# Patient Record
Sex: MALE | Race: WHITE | NOT HISPANIC OR LATINO | Employment: FULL TIME | URBAN - METROPOLITAN AREA
[De-identification: names, ages, dates, MRNs, and addresses within clinical notes are randomized per-mention and may not be internally consistent; named-entity substitution may affect disease eponyms.]

---

## 2017-03-14 RX ORDER — PANTOPRAZOLE SODIUM 40 MG/1
40 TABLET, DELAYED RELEASE ORAL DAILY
Qty: 30 TABLET | Refills: 10 | OUTPATIENT
Start: 2017-03-14

## 2017-03-14 NOTE — TELEPHONE ENCOUNTER
----- Message from Maritza Vyas sent at 3/14/2017 12:54 PM CDT -----  Contact: dav/wife [phone]  State that pt needs refill on pantoprazole. Pt uses     [id number] - [place] - [address]  [address]  [place] [address]  Phone: [phone] Fax: [phone]    Please call back at [phone]//thank you acc

## 2017-03-14 NOTE — TELEPHONE ENCOUNTER
I have refused a medicine for the patient.  Please CALL the patient and book the patient for a physical with one of us here in the clinic.   PLEASE DOCUMENT THE FACT THAT YOU HAVE CONTACTED THE PATIENT IN THE NOTES AND NOT IN ROUTING COMMENTS FOR FUTURE REFERENCE

## 2017-03-15 RX ORDER — PANTOPRAZOLE SODIUM 40 MG/1
40 TABLET, DELAYED RELEASE ORAL DAILY
Qty: 30 TABLET | Refills: 10 | OUTPATIENT
Start: 2017-03-15

## 2017-03-16 ENCOUNTER — OFFICE VISIT (OUTPATIENT)
Dept: FAMILY MEDICINE | Facility: CLINIC | Age: 47
End: 2017-03-16
Payer: COMMERCIAL

## 2017-03-16 VITALS
WEIGHT: 168.13 LBS | HEART RATE: 73 BPM | OXYGEN SATURATION: 98 % | DIASTOLIC BLOOD PRESSURE: 83 MMHG | TEMPERATURE: 98 F | SYSTOLIC BLOOD PRESSURE: 134 MMHG | BODY MASS INDEX: 30.94 KG/M2 | HEIGHT: 62 IN

## 2017-03-16 DIAGNOSIS — Z00.00 ANNUAL PHYSICAL EXAM: Primary | ICD-10-CM

## 2017-03-16 DIAGNOSIS — K21.9 GASTROESOPHAGEAL REFLUX DISEASE WITHOUT ESOPHAGITIS: ICD-10-CM

## 2017-03-16 PROCEDURE — 99999 PR PBB SHADOW E&M-EST. PATIENT-LVL III: CPT | Mod: PBBFAC,,, | Performed by: FAMILY MEDICINE

## 2017-03-16 PROCEDURE — 99396 PREV VISIT EST AGE 40-64: CPT | Mod: S$GLB,,, | Performed by: FAMILY MEDICINE

## 2017-03-16 RX ORDER — PANTOPRAZOLE SODIUM 40 MG/1
40 TABLET, DELAYED RELEASE ORAL DAILY
Qty: 30 TABLET | Refills: 0 | Status: SHIPPED | OUTPATIENT
Start: 2017-03-16 | End: 2017-03-16 | Stop reason: SDUPTHER

## 2017-03-16 RX ORDER — PANTOPRAZOLE SODIUM 40 MG/1
40 TABLET, DELAYED RELEASE ORAL DAILY
Qty: 90 TABLET | Refills: 3 | Status: SHIPPED | OUTPATIENT
Start: 2017-03-16 | End: 2018-09-13 | Stop reason: SDUPTHER

## 2017-03-16 NOTE — MR AVS SNAPSHOT
[facility]  [address]  [place] [address]  Phone: [phone]  Fax: [phone]                  Young Haynes Jr.   3/16/2017 3:00 PM   Office Visit    Description:  Male : 1970   Provider:  Oswaldo Childress MD   Department:  [facility]           Diagnoses this Visit        Comments    Annual physical exam    -  Primary     Gastroesophageal reflux disease without esophagitis                To Do List           Future Appointments        Provider Department Dept Phone    3/17/2017 11:25 AM LABORATORY, [place] [facility] [phone]      Goals (5 Years of Data)     None       These Medications        Disp Refills Start End    pantoprazole (PROTONIX) 40 MG tablet 90 tablet 3 3/16/2017     Take 1 tablet (40 mg total) by mouth once daily. - Oral    Pharmacy: [id number] - [place] - [address] #: [phone]         [facility]     [facility] Nurse Care Line -  Assistance  Registered nurses in the [facility] provide clinical advisement, health education, appointment booking, and other advisory services.  Call for this free service at [phone].             Medications           Message regarding Medications     Verify the changes and/or additions to your medication regime listed below are the same as discussed with your clinician today.  If any of these changes or additions are incorrect, please notify your healthcare provider.        STOP taking these medications     sucralfate (CARAFATE) 1 gram tablet Take 1 tablet (1 g total) by mouth 4 (four) times daily before meals and nightly.           Verify that the below list of medications is an accurate representation of the medications you are currently taking.  If none reported, the list may be blank. If incorrect, please contact your healthcare provider. Carry this list with you in case of emergency.          "  Current Medications     pantoprazole (PROTONIX) 40 MG tablet Take 1 tablet (40 mg total) by mouth once daily.           Clinical Reference Information           Your Vitals Were     BP Pulse Temp Height Weight SpO2    134/83 73 98.3 °F (36.8 °C) (Oral) 5' 2" (1.575 m) 76.2 kg (168 lb 1.6 oz) 98%    BMI                30.75 kg/m2          Blood Pressure          Most Recent Value    BP  134/83      Allergies as of 3/16/2017     No Known Allergies      Immunizations Administered on Date of Encounter - 3/16/2017     None      Orders Placed During Today's Visit     Future Labs/Procedures Expected by Expires    Comprehensive metabolic panel  3/17/2017 (Approximate) 3/31/2017    Lipid panel  3/17/2017 (Approximate) 3/31/2017      [facility] Sign-Up     Activating your [facility] account is as easy as 1-2-3!     1) Visit [email or web address], select Sign Up Now, enter this activation code and your date of birth, then select Next.  Activation code not generated  Current Patient Portal Status: Account disabled      2) Create a username and password to use when you visit [facility] in the future and select a security question in case you lose your password and select Next.    3) Enter your e-mail address and click Sign Up!    Additional Information  If you have questions, please e-mail [email or web address] or call [phone] to talk to our [facility] staff. Remember, [facility] is NOT to be used for urgent needs. For medical emergencies, dial 911.         Language Assistance Services     ATTENTION: Language assistance services are available, free of charge. Please call [phone].      ATENCIÓN: Si habla español, tiene a valenzuela disposición servicios gratuitos de asistencia lingüística. Llame al [phone].     CHÚ Ý: N?u b?n nói Ti?ng Vi?t, có các d?ch v? h? tr? ngôn ng? mi?n phí dành cho b?n. G?i s? [phone].         [facility] complies with applicable Federal civil rights laws and does not discriminate on " the basis of race, color, national origin, age, disability, or sex.

## 2017-03-16 NOTE — PROGRESS NOTES
Subjective:      Patient ID: Young Haynes Jr. is a 46 y.o. male.    Chief Complaint: annual  HPIhe has GERD. Crawfish and redbeans flare him.   The patient presents with GERD.  Denies chest pain, nausea & vomiting, belching, cramping, distention, dyspepsia, dysphagia, hematochezia, melena, abdominal pain and weight loss.  Current treatment has included medications that are listed in medications list with significant response.  There has been no medicine intolerance.  The patient cannot identify any exacerbating factors.  Avoidance of NSAID's, ASA, carbonated beverages and spicy food was discussed.  We discussed limiting the dose of the med and to see if he could change to qod dosing of this.  He will try this.     Health Maintenance Due   Topic Date Due    Lipid Panel  1970    Influenza Vaccine  08/01/2016       Past Medical History:  Past Medical History:   Diagnosis Date    GERD (gastroesophageal reflux disease)      No past surgical history on file.  Review of patient's allergies indicates:  No Known Allergies  Current Outpatient Prescriptions on File Prior to Visit   Medication Sig Dispense Refill    pantoprazole (PROTONIX) 40 MG tablet Take 1 tablet (40 mg total) by mouth once daily. 30 tablet 0                   No current facility-administered medications on file prior to visit.      Social History     Social History    Marital status:      Spouse name: N/A    Number of children: N/A    Years of education: N/A     Occupational History     [employer]     Social History Main Topics    Smoking status: Never Smoker    Smokeless tobacco: Never Used    Alcohol use No    Drug use: No    Sexual activity: Not on file     Other Topics Concern    Not on file     Social History Narrative     Family History   Problem Relation Age of Onset    Hypertension Father     Cancer Maternal Grandfather     Heart attack Paternal Grandfather     Stroke Paternal Grandfather     Diabetes Paternal  "Grandfather              Review of Systems   Constitutional: Negative.  Negative for chills, diaphoresis and fever.   HENT: Negative for congestion, hearing loss, mouth sores, postnasal drip and sore throat.    Eyes: Negative for pain and visual disturbance.   Respiratory: Negative for cough, chest tightness, shortness of breath and wheezing.    Cardiovascular: Negative for chest pain.   Gastrointestinal: Negative for abdominal pain, anal bleeding, blood in stool, constipation, diarrhea, nausea and vomiting.   Genitourinary: Negative for dysuria and hematuria.   Musculoskeletal: Negative for back pain, neck pain and neck stiffness.   Skin: Negative for rash.   Neurological: Negative for dizziness and weakness.       Objective:   /83  Pulse 73  Temp 98.3 °F (36.8 °C) (Oral)   Ht 5' 2" (1.575 m)  Wt 76.2 kg (168 lb 1.6 oz)  SpO2 98%  BMI 30.75 kg/m2    Physical Exam   Constitutional: He is oriented to person, place, and time. He appears well-developed and well-nourished.   HENT:   Head: Normocephalic and atraumatic.   Right Ear: External ear normal.   Left Ear: External ear normal.   Nose: Nose normal.   Mouth/Throat: Oropharynx is clear and moist. No oropharyngeal exudate.   Eyes: Conjunctivae and EOM are normal. Pupils are equal, round, and reactive to light. Right eye exhibits no discharge. Left eye exhibits no discharge. No scleral icterus.   Neck: Normal range of motion. Neck supple. No JVD present. No thyromegaly present.   Cardiovascular: Normal rate, regular rhythm, normal heart sounds and intact distal pulses.  Exam reveals no gallop and no friction rub.    No murmur heard.  Pulmonary/Chest: Effort normal and breath sounds normal. No respiratory distress. He has no wheezes. He has no rales. He exhibits no tenderness.   Abdominal: Soft. Bowel sounds are normal. He exhibits no distension and no mass. There is no tenderness. There is no rebound and no guarding.   Musculoskeletal: Normal range of " motion. He exhibits no edema or tenderness.   Lymphadenopathy:     He has no cervical adenopathy.   Neurological: He is alert and oriented to person, place, and time. No cranial nerve deficit. Coordination normal.   Skin: Skin is warm and dry. He is not diaphoretic.   Psychiatric: He has a normal mood and affect.       Assessment:     1. Annual physical exam    2. Gastroesophageal reflux disease without esophagitis        Plan:   Diagnoses and all orders for this visit:    Annual physical exam  -     Comprehensive metabolic panel; Future  -     Lipid panel; Future    Gastroesophageal reflux disease without esophagitis  -     pantoprazole (PROTONIX) 40 MG tablet; Take 1 tablet (40 mg total) by mouth once daily.        rtc annually.

## 2017-03-17 ENCOUNTER — LAB VISIT (OUTPATIENT)
Dept: LAB | Facility: HOSPITAL | Age: 47
End: 2017-03-17
Attending: FAMILY MEDICINE
Payer: COMMERCIAL

## 2017-03-17 DIAGNOSIS — Z00.00 ANNUAL PHYSICAL EXAM: ICD-10-CM

## 2017-03-17 LAB
ALBUMIN SERPL BCP-MCNC: 4 G/DL
ALP SERPL-CCNC: 66 U/L
ALT SERPL W/O P-5'-P-CCNC: 39 U/L
ANION GAP SERPL CALC-SCNC: 7 MMOL/L
AST SERPL-CCNC: 22 U/L
BILIRUB SERPL-MCNC: 0.5 MG/DL
BUN SERPL-MCNC: 15 MG/DL
CALCIUM SERPL-MCNC: 9.3 MG/DL
CHLORIDE SERPL-SCNC: 104 MMOL/L
CHOLEST/HDLC SERPL: 2.6 {RATIO}
CO2 SERPL-SCNC: 28 MMOL/L
CREAT SERPL-MCNC: 0.9 MG/DL
EST. GFR  (AFRICAN AMERICAN): >60 ML/MIN/1.73 M^2
EST. GFR  (NON AFRICAN AMERICAN): >60 ML/MIN/1.73 M^2
GLUCOSE SERPL-MCNC: 87 MG/DL
HDL/CHOLESTEROL RATIO: 37.7 %
HDLC SERPL-MCNC: 151 MG/DL
HDLC SERPL-MCNC: 57 MG/DL
LDLC SERPL CALC-MCNC: 86.4 MG/DL
NONHDLC SERPL-MCNC: 94 MG/DL
POTASSIUM SERPL-SCNC: 4.5 MMOL/L
PROT SERPL-MCNC: 6.6 G/DL
SODIUM SERPL-SCNC: 139 MMOL/L
TRIGL SERPL-MCNC: 38 MG/DL

## 2017-03-17 PROCEDURE — 80061 LIPID PANEL: CPT

## 2017-03-17 PROCEDURE — 80053 COMPREHEN METABOLIC PANEL: CPT

## 2017-03-17 PROCEDURE — 36415 COLL VENOUS BLD VENIPUNCTURE: CPT | Mod: PO

## 2017-04-05 ENCOUNTER — OFFICE VISIT (OUTPATIENT)
Dept: FAMILY MEDICINE | Facility: CLINIC | Age: 47
End: 2017-04-05
Payer: COMMERCIAL

## 2017-04-05 ENCOUNTER — LAB VISIT (OUTPATIENT)
Dept: LAB | Facility: HOSPITAL | Age: 47
End: 2017-04-05
Attending: FAMILY MEDICINE
Payer: COMMERCIAL

## 2017-04-05 VITALS
OXYGEN SATURATION: 96 % | WEIGHT: 167.88 LBS | SYSTOLIC BLOOD PRESSURE: 129 MMHG | HEIGHT: 62 IN | DIASTOLIC BLOOD PRESSURE: 80 MMHG | BODY MASS INDEX: 30.89 KG/M2 | HEART RATE: 79 BPM | TEMPERATURE: 98 F

## 2017-04-05 DIAGNOSIS — R31.29 MICROSCOPIC HEMATURIA: ICD-10-CM

## 2017-04-05 DIAGNOSIS — R10.31 RIGHT GROIN PAIN: ICD-10-CM

## 2017-04-05 DIAGNOSIS — R10.2 SUPRAPUBIC DISCOMFORT: Primary | ICD-10-CM

## 2017-04-05 DIAGNOSIS — R10.2 SUPRAPUBIC DISCOMFORT: ICD-10-CM

## 2017-04-05 LAB
BILIRUB UR QL STRIP: NEGATIVE
CLARITY UR: CLEAR
COLOR UR: YELLOW
GLUCOSE UR QL STRIP: NEGATIVE
HGB UR QL STRIP: ABNORMAL
KETONES UR QL STRIP: NEGATIVE
LEUKOCYTE ESTERASE UR QL STRIP: NEGATIVE
NITRITE UR QL STRIP: NEGATIVE
PH UR STRIP: 7 [PH] (ref 5–8)
PROT UR QL STRIP: NEGATIVE
SP GR UR STRIP: 1.02 (ref 1–1.03)
URN SPEC COLLECT METH UR: ABNORMAL
WBC # BLD AUTO: 6.67 K/UL

## 2017-04-05 PROCEDURE — 99999 PR PBB SHADOW E&M-EST. PATIENT-LVL III: CPT | Mod: PBBFAC,,, | Performed by: NURSE PRACTITIONER

## 2017-04-05 PROCEDURE — 36415 COLL VENOUS BLD VENIPUNCTURE: CPT | Mod: PO

## 2017-04-05 PROCEDURE — 81002 URINALYSIS NONAUTO W/O SCOPE: CPT | Mod: PO

## 2017-04-05 PROCEDURE — 99213 OFFICE O/P EST LOW 20 MIN: CPT | Mod: S$GLB,,, | Performed by: NURSE PRACTITIONER

## 2017-04-05 PROCEDURE — 85048 AUTOMATED LEUKOCYTE COUNT: CPT | Mod: PO

## 2017-04-05 PROCEDURE — 1160F RVW MEDS BY RX/DR IN RCRD: CPT | Mod: S$GLB,,, | Performed by: NURSE PRACTITIONER

## 2017-04-05 NOTE — MR AVS SNAPSHOT
"    [facility]  [address] [address]  Phone: [phone]  Fax: [phone]                  Young Haynes JrWarren   2017 3:00 PM   Office Visit    Description:  Male : 1970   Provider:  Gina Quispe NP   Department:  [facility]           Reason for Visit     Abdominal Pain           Diagnoses this Visit        Comments    Suprapubic discomfort    -  Primary     Right groin pain         Microscopic hematuria                To Do List           Goals (5 Years of Data)     None      [facility]     [facility] On [facility] Nurse Care Line -  Assistance  Unless otherwise directed by your provider, please contact [facility], our nurse care line that is available for  assistance.     Registered nurses in the [facility] provide: appointment scheduling, clinical advisement, health education, and other advisory services.  Call: [phone] (toll free)               Medications           Message regarding Medications     Verify the changes and/or additions to your medication regime listed below are the same as discussed with your clinician today.  If any of these changes or additions are incorrect, please notify your healthcare provider.             Verify that the below list of medications is an accurate representation of the medications you are currently taking.  If none reported, the list may be blank. If incorrect, please contact your healthcare provider. Carry this list with you in case of emergency.           Current Medications     pantoprazole (PROTONIX) 40 MG tablet Take 1 tablet (40 mg total) by mouth once daily.           Clinical Reference Information           Your Vitals Were     BP Pulse Temp Height Weight SpO2    129/80 79 98.2 °F (36.8 °C) 5' 2" (1.575 m) 76.1 kg (167 lb 14.1 oz) 96%    BMI                30.71 kg/m2          Blood Pressure          Most Recent Value    BP  129/80      Allergies as of " 4/5/2017     No Known Allergies      Immunizations Administered on Date of Encounter - 4/5/2017     None      Orders Placed During Today's Visit      Normal Orders This Visit    Urinalysis     Future Labs/Procedures Expected by Expires    CT Renal Stone Study ABD Pelvis WO  4/5/2017 4/5/2018    WBC  4/5/2017 6/4/2018      Instructions    Report to ER immediately if symptoms worsen       Language Assistance Services     ATTENTION: Language assistance services are available, free of charge. Please call [phone].      ATENCIÓN: Si neftalila alex, tiene a valenzuela disposición servicios gratuitos de asistencia lingüística. Llame al [phone].     CHÚ Ý: N?u b?n nói Ti?ng Vi?t, có các d?ch v? h? tr? ngôn ng? mi?n phí dành cho b?n. G?i s? [phone].         [facility] complies with applicable Federal civil rights laws and does not discriminate on the basis of race, color, national origin, age, disability, or sex.

## 2017-04-05 NOTE — PROGRESS NOTES
Subjective:       Patient ID: Young Haynes Jr. is a 46 y.o. male.    Chief Complaint: Abdominal Pain    Abdominal Pain   This is a new problem. The current episode started 1 to 4 weeks ago. The onset quality is gradual. The problem occurs intermittently. The problem has been unchanged. The pain is located in the suprapubic region (right groin). The quality of the pain is dull and aching. The abdominal pain does not radiate. Associated symptoms include nausea. Pertinent negatives include no anorexia, arthralgias, belching, constipation, diarrhea, dysuria, fever, flatus, frequency, headaches, hematochezia, hematuria, melena, myalgias, vomiting or weight loss. The pain is aggravated by urination. The pain is relieved by nothing. He has tried nothing for the symptoms. The treatment provided no relief. Prior diagnostic workup includes CT scan (Ordered today). His past medical history is significant for GERD. There is no history of abdominal surgery, colon cancer, Crohn's disease, gallstones, irritable bowel syndrome, pancreatitis, PUD or ulcerative colitis.     Past Medical History:   Diagnosis Date    GERD (gastroesophageal reflux disease)      Social History     Social History    Marital status:      Spouse name: N/A    Number of children: N/A    Years of education: N/A     Occupational History     [employer]     Social History Main Topics    Smoking status: Never Smoker    Smokeless tobacco: Never Used    Alcohol use No    Drug use: No    Sexual activity: Not on file     Social History Narrative     History reviewed. No pertinent surgical history.    Review of Systems   Constitutional: Negative.  Negative for fever and weight loss.   HENT: Negative.    Eyes: Negative.    Respiratory: Negative.    Cardiovascular: Negative.    Gastrointestinal: Positive for abdominal pain and nausea. Negative for anorexia, constipation, diarrhea, flatus, hematochezia, melena and vomiting.   Endocrine: Negative.     Genitourinary: Negative.  Negative for dysuria, frequency and hematuria.   Musculoskeletal: Negative.  Negative for arthralgias and myalgias.   Skin: Negative.    Allergic/Immunologic: Negative.    Neurological: Negative.  Negative for headaches.   Psychiatric/Behavioral: Negative.        Objective:      Physical Exam   Constitutional: He is oriented to person, place, and time. He appears well-developed and well-nourished.   HENT:   Head: Normocephalic.   Right Ear: External ear normal.   Left Ear: External ear normal.   Nose: Nose normal.   Mouth/Throat: Oropharynx is clear and moist.   Eyes: Conjunctivae are normal. Pupils are equal, round, and reactive to light.   Neck: Normal range of motion. Neck supple.   Cardiovascular: Normal rate, regular rhythm and normal heart sounds.    Pulmonary/Chest: Effort normal and breath sounds normal.   Abdominal: Soft. Bowel sounds are normal. There is tenderness in the suprapubic area. There is no rigidity, no rebound, no guarding, no CVA tenderness, no tenderness at McBurney's point and negative Briseno's sign.   Musculoskeletal: Normal range of motion.   Neurological: He is alert and oriented to person, place, and time.   Skin: Skin is warm and dry.   Psychiatric: He has a normal mood and affect. His behavior is normal. Judgment and thought content normal.   Nursing note and vitals reviewed.      Assessment:       1. Suprapubic discomfort    2. Right groin pain    3.      Microscopic hematuria   Plan:           Young was seen today for abdominal pain.    Diagnoses and all orders for this visit:    Suprapubic discomfort  Right groin pain  Microscopic hematuria  -     Urinalysis  -     WBC; Future  -     CT Renal Stone Study ABD Pelvis WO; Future  Report to ER immediately if symptoms worsen

## 2017-04-06 ENCOUNTER — TELEPHONE (OUTPATIENT)
Dept: FAMILY MEDICINE | Facility: CLINIC | Age: 47
End: 2017-04-06

## 2017-04-06 NOTE — TELEPHONE ENCOUNTER
----- Message from Joan Bishop sent at 4/6/2017 11:54 AM CDT -----  Contact: Wife- [phone]  Would like an order for MRI sent to [facility].  Please call back @ [phone].  Thanks-[facility]

## 2017-04-10 ENCOUNTER — HOSPITAL ENCOUNTER (OUTPATIENT)
Dept: RADIOLOGY | Facility: HOSPITAL | Age: 47
Discharge: HOME | End: 2017-04-10
Attending: NURSE PRACTITIONER
Payer: COMMERCIAL

## 2017-04-10 DIAGNOSIS — R10.31 RIGHT GROIN PAIN: ICD-10-CM

## 2017-04-10 DIAGNOSIS — R31.29 MICROSCOPIC HEMATURIA: ICD-10-CM

## 2017-04-10 DIAGNOSIS — R10.2 SUPRAPUBIC DISCOMFORT: ICD-10-CM

## 2017-04-10 PROCEDURE — 74176 CT ABD & PELVIS W/O CONTRAST: CPT | Mod: 26,,, | Performed by: RADIOLOGY

## 2017-04-10 PROCEDURE — 74176 CT ABD & PELVIS W/O CONTRAST: CPT | Mod: TC,PO

## 2017-04-11 ENCOUNTER — PATIENT MESSAGE (OUTPATIENT)
Dept: FAMILY MEDICINE | Facility: CLINIC | Age: 47
End: 2017-04-11

## 2017-04-12 NOTE — TELEPHONE ENCOUNTER
I reviewed the chart and all testing and called the pateint.  At this time, he is having pain when he urinates and he has had pressure and pain go to hsi back and scrotum.  I suggested that he come to me today to get his prostate checked but he can't now and requested to come tomorrow and would like to see a man.  Please book with Edward After 3 PM for a prostate check and for abdominal pain and ask Edward to consider a urology referral due to his urinary issues and horseshoe kidney if nothing is found on the prostate like prostatitis.

## 2017-04-12 NOTE — TELEPHONE ENCOUNTER
Patient will be coming to see you tomorrow. Dr. Childress suggest you consider a urology referral after checking his prostate

## 2017-04-12 NOTE — TELEPHONE ENCOUNTER
Pt wants to know what kind of Pain medicine does the Doctor recommend or what else he could do for the pain.

## 2017-04-13 ENCOUNTER — OFFICE VISIT (OUTPATIENT)
Dept: FAMILY MEDICINE | Facility: CLINIC | Age: 47
End: 2017-04-13
Payer: COMMERCIAL

## 2017-04-13 VITALS
BODY MASS INDEX: 30.86 KG/M2 | HEART RATE: 72 BPM | WEIGHT: 167.69 LBS | HEIGHT: 62 IN | DIASTOLIC BLOOD PRESSURE: 83 MMHG | SYSTOLIC BLOOD PRESSURE: 122 MMHG

## 2017-04-13 DIAGNOSIS — R10.2 SUPRAPUBIC DISCOMFORT: Primary | ICD-10-CM

## 2017-04-13 PROCEDURE — 99999 PR PBB SHADOW E&M-EST. PATIENT-LVL III: CPT | Mod: PBBFAC,,,

## 2017-04-13 PROCEDURE — 99213 OFFICE O/P EST LOW 20 MIN: CPT | Mod: S$GLB,,,

## 2017-04-13 PROCEDURE — 1160F RVW MEDS BY RX/DR IN RCRD: CPT | Mod: S$GLB,,,

## 2017-04-13 NOTE — PROGRESS NOTES
Subjective:       Patient ID: Young Haynes Jr. is a 46 y.o. male.    Chief Complaint: No chief complaint on file.    HPI   Patient presents today with a 6 day complaint of suprapubic discomfort that he describes as a constant moderate to severe intensity pressure he says when he urinates he feels a pulling sensation in his right lower quadrant.  Says that the pain radiates to his testicles while urinating.  He denies any pain with bowel movements.  She denies any feeling of fullness in his perineum with setting.  He denies any difficulty starting or stopping urine flow.  He denies any dribbling.    Review of Systems   Constitutional: Negative for activity change, appetite change, fatigue and unexpected weight change.   HENT: Negative.    Eyes: Negative.    Respiratory: Negative for cough, chest tightness, shortness of breath and wheezing.    Cardiovascular: Negative for chest pain, palpitations and leg swelling.   Gastrointestinal: Positive for abdominal pain. Negative for blood in stool, constipation, diarrhea, nausea and vomiting.   Endocrine: Negative.    Genitourinary: Positive for testicular pain. Negative for discharge, penile pain, penile swelling and scrotal swelling.   Musculoskeletal: Negative.    Skin: Negative for color change.   Allergic/Immunologic: Negative.    Neurological: Negative for dizziness, weakness and light-headedness.   Hematological: Negative.    Psychiatric/Behavioral: Negative for sleep disturbance.         Objective:      Physical Exam   Constitutional: He is oriented to person, place, and time. Vital signs are normal. He appears well-developed and well-nourished. He is active and cooperative. No distress.   HENT:   Head: Normocephalic and atraumatic.   Eyes: Conjunctivae are normal. Pupils are equal, round, and reactive to light. No scleral icterus.   Neck: Normal range of motion. Neck supple.   Cardiovascular: Normal rate, regular rhythm, normal heart sounds and intact distal  pulses.  Exam reveals no gallop and no friction rub.    No murmur heard.  Pulmonary/Chest: Effort normal and breath sounds normal. No respiratory distress. He has no wheezes. He has no rales. He exhibits no tenderness.   Abdominal: Normal appearance and bowel sounds are normal. He exhibits no shifting dullness, no distension, no pulsatile liver, no fluid wave, no abdominal bruit, no ascites, no pulsatile midline mass and no mass. There is no hepatosplenomegaly. There is tenderness in the right lower quadrant, suprapubic area and left lower quadrant. There is no rigidity, no rebound, no guarding, no CVA tenderness, no tenderness at McBurney's point and negative Briseno's sign. No hernia. Hernia confirmed negative in the right inguinal area and confirmed negative in the left inguinal area.   Genitourinary: Rectum normal, prostate normal, testes normal and penis normal. Cremasteric reflex is present. Right testis shows no mass, no swelling and no tenderness. Right testis is descended. Cremasteric reflex is not absent on the right side. Left testis shows no mass, no swelling and no tenderness. Left testis is descended. Cremasteric reflex is not absent on the left side. Uncircumcised.   Musculoskeletal: Normal range of motion. He exhibits no edema or tenderness.   Lymphadenopathy: No inguinal adenopathy noted on the right or left side.   Neurological: He is alert and oriented to person, place, and time. He exhibits normal muscle tone. Coordination normal.   Skin: Skin is warm and dry. No rash noted. He is not diaphoretic. No erythema. No pallor.   Psychiatric: He has a normal mood and affect. His speech is normal and behavior is normal. Judgment and thought content normal.       Assessment:       1. Suprapubic discomfort          Plan:   Suprapubic discomfort  -     Ambulatory referral to Urology            Disclaimer: This note is prepared using voice recognition software.  As such there may be errors in the dictation.   It has not been proofread.

## 2017-04-13 NOTE — MR AVS SNAPSHOT
[facility]  [address] [facility][1] [address]  Phone: [phone]  Fax: [phone]                  Young Haynes JrWarren   2017 5:40 PM   Office Visit    Description:  Male : 1970   Provider:  NICHOLAS Rivera   Department:  [facility]           Diagnoses this Visit        Comments    Suprapubic discomfort    -  Primary            To Do List           Future Appointments        Provider Department Dept Phone    2017 5:40 PM NICHOLAS Rivera [facility] [phone]    2017 1:40 PM Oswaldo Childress MD [facility] [phone]    5/10/2017 10:45 AM SHELTON Bowen MD [facility] [phone]      Goals (5 Years of Data)     None      [facility]     [facility] Care Line -  Assistance  Unless otherwise directed by your provider, please contact [facility], our nurse care line that is available for  assistance.     Registered nurses in the [facility] provide: appointment scheduling, clinical advisement, health education, and other advisory services.  Call: [phone] (toll free)               Medications           Message regarding Medications     Verify the changes and/or additions to your medication regime listed below are the same as discussed with your clinician today.  If any of these changes or additions are incorrect, please notify your healthcare provider.             Verify that the below list of medications is an accurate representation of the medications you are currently taking.  If none reported, the list may be blank. If incorrect, please contact your healthcare provider. Carry this list with you in case of emergency.           Current Medications     pantoprazole (PROTONIX) 40 MG tablet Take 1 tablet (40 mg total) by mouth once daily.           Clinical Reference Information           Your Vitals Were     BP Pulse Height Weight BMI     "122/83 72 5' 2" (1.575 m) 76.1 kg (167 lb 10.6 oz) 30.67 kg/m2      Blood Pressure          Most Recent Value    BP  122/83      Allergies as of 4/13/2017     No Known Allergies      Immunizations Administered on Date of Encounter - 4/13/2017     None      Orders Placed During Today's Visit      Normal Orders This Visit    Ambulatory referral to Urology       Language Assistance Services     ATTENTION: Language assistance services are available, free of charge. Please call [phone].      ATENCIÓN: Si thomas burrellvon, tiene a valenzuela disposición servicios gratuitos de asistencia lingüística. Llame al [phone].     DAMON Ý: N?u b?n nói Ti?ng Vi?t, có các d?ch v? h? tr? ngôn ng? mi?n phí dành cho b?n. G?i s? [phone].         [facility] complies with applicable Federal civil rights laws and does not discriminate on the basis of race, color, national origin, age, disability, or sex.        "

## 2017-05-08 ENCOUNTER — OFFICE VISIT (OUTPATIENT)
Dept: FAMILY MEDICINE | Facility: CLINIC | Age: 47
End: 2017-05-08
Payer: COMMERCIAL

## 2017-05-08 VITALS
DIASTOLIC BLOOD PRESSURE: 82 MMHG | BODY MASS INDEX: 31.46 KG/M2 | SYSTOLIC BLOOD PRESSURE: 120 MMHG | WEIGHT: 170.94 LBS | HEIGHT: 62 IN | HEART RATE: 71 BPM

## 2017-05-08 DIAGNOSIS — K80.20 CALCULUS OF GALLBLADDER WITHOUT CHOLECYSTITIS WITHOUT OBSTRUCTION: ICD-10-CM

## 2017-05-08 DIAGNOSIS — Q63.1 HORSESHOE KIDNEY: ICD-10-CM

## 2017-05-08 PROCEDURE — 99213 OFFICE O/P EST LOW 20 MIN: CPT | Mod: S$GLB,,, | Performed by: FAMILY MEDICINE

## 2017-05-08 PROCEDURE — 1160F RVW MEDS BY RX/DR IN RCRD: CPT | Mod: S$GLB,,, | Performed by: FAMILY MEDICINE

## 2017-05-08 PROCEDURE — 99999 PR PBB SHADOW E&M-EST. PATIENT-LVL III: CPT | Mod: PBBFAC,,, | Performed by: FAMILY MEDICINE

## 2017-05-08 NOTE — PROGRESS NOTES
Subjective:      Patient ID: Young Haynes Jr. is a 46 y.o. male.    Chief Complaint: Follow-up    HPI   He is f/u on his suprapubic pain which has resolved since he had his last visit.     Narrative      CT of the abdomen and pelvis without contrast    History: Right lower abdominal pain    Comparison: None    Technique:CT of the Abdomen and Pelvis was acquired helically without IV contrast from the lung bases through the ischial tuberosities. Axial and reformatted images were reviewed.     Findings:    ABDOMEN    Lung bases:Unremarkable    Liver/gallbladder/biliary:The liver demonstrates a negative noncontrast appearance. There are a few calcified gallstones near the neck of the gallbladder.  No inflammatory stranding seen adjacent to the gallbladder.No biliary ductal dilation.    Pancreas:The pancreas demonstrates a negative noncontrast appearance.    Spleen:The spleen is not enlarged.    Adrenals:Unremarkable    Kidneys:A horseshoe kidney is noted.  There is a nonobstructing calculus within the lower pole of the left kidney that measures 3 mm.    Bowel/Mesentery:There is no evidence of bowel obstruction. No mesenteric stranding or adenopathy.    Retroperitoneum:No adenopathy.The aorta demonstrates a normal caliber.    PELVIS:    Genitourinary/Reproductive organs:Calcification noted within the central portion of the prostate gland.    Adenopathy:None    Free Fluid:No free fluid    Osseus Structures/Soft tissues:No suspicious appearing osseus lesions. No significant soft tissue abnormality.       Impression          1. Horseshoe kidney demonstrating a single nonobstructing lower pole left renal calculus.    2.  Cholelithiasis without evidence to suggest cholecystitis.    3.  Normal appearing appendix demonstrating an appendicolith near the tip.  No inflammatory stranding adjacent to the appendix.          Electronically signed by: NEELAM SMITH D.O.  Date: 04/10/17  Time: 15:12         Encounter      View  "Encounter           He states that he does not have pain in the epigastric or RUQ areas.   He states that he has been doing well with out any pain noted now.  He has been on a PPI in the past and is still on this.      Review of Systems    Objective:   /82  Pulse 71  Ht 5' 2" (1.575 m)  Wt 77.6 kg (170 lb 15.5 oz)  BMI 31.27 kg/m2    Physical Exam   Constitutional: He appears well-developed and well-nourished.   Cardiovascular: Normal rate, regular rhythm and normal heart sounds.    Pulmonary/Chest: Effort normal and breath sounds normal.   Abdominal: Soft. Bowel sounds are normal. He exhibits no distension and no mass. There is no tenderness. There is no rebound and no guarding.       Assessment:     1. Calculus of gallbladder without cholecystitis without obstruction    2. Horseshoe kidney        Plan:   Young was seen today for follow-up.    Diagnoses and all orders for this visit:    Calculus of gallbladder without cholecystitis without obstruction    Horseshoe kidney          "

## 2017-05-10 ENCOUNTER — OFFICE VISIT (OUTPATIENT)
Dept: UROLOGY | Facility: CLINIC | Age: 47
End: 2017-05-10
Payer: COMMERCIAL

## 2017-05-10 VITALS
HEIGHT: 62 IN | SYSTOLIC BLOOD PRESSURE: 128 MMHG | DIASTOLIC BLOOD PRESSURE: 84 MMHG | WEIGHT: 170.19 LBS | BODY MASS INDEX: 31.32 KG/M2 | RESPIRATION RATE: 18 BRPM | HEART RATE: 68 BPM

## 2017-05-10 DIAGNOSIS — Q63.1 HORSESHOE KIDNEY: ICD-10-CM

## 2017-05-10 DIAGNOSIS — N20.0 KIDNEY STONE: ICD-10-CM

## 2017-05-10 DIAGNOSIS — R10.9 RIGHT FLANK PAIN: Primary | ICD-10-CM

## 2017-05-10 LAB
BILIRUB SERPL-MCNC: NORMAL MG/DL
BLOOD URINE, POC: NORMAL
COLOR, POC UA: NORMAL
GLUCOSE UR QL STRIP: NORMAL
KETONES UR QL STRIP: NORMAL
LEUKOCYTE ESTERASE URINE, POC: NORMAL
NITRITE, POC UA: NORMAL
PH, POC UA: 7
PROTEIN, POC: NORMAL
SPECIFIC GRAVITY, POC UA: 1.01
UROBILINOGEN, POC UA: NORMAL

## 2017-05-10 PROCEDURE — 99999 PR PBB SHADOW E&M-EST. PATIENT-LVL III: CPT | Mod: PBBFAC,,, | Performed by: UROLOGY

## 2017-05-10 PROCEDURE — 81002 URINALYSIS NONAUTO W/O SCOPE: CPT | Mod: S$GLB,,, | Performed by: UROLOGY

## 2017-05-10 PROCEDURE — 1160F RVW MEDS BY RX/DR IN RCRD: CPT | Mod: S$GLB,,, | Performed by: UROLOGY

## 2017-05-10 PROCEDURE — 99204 OFFICE O/P NEW MOD 45 MIN: CPT | Mod: 25,S$GLB,, | Performed by: UROLOGY

## 2017-05-10 NOTE — PROGRESS NOTES
Subjective:       Patient ID: Young Haynes Jr. is a 46 y.o. male.    Chief Complaint: Consult (horse- shoe shaped kidney)    HPI     46 year old with right flank pain last month.  He also complains of a pressure sensation in his bladder and some problems urinating.   These symptoms have since resolved but he still has some intermittent discomfort in the right flank area.  He denies hematuria and dysuria.  He denies nocturia.  He has no history of stones.  He underwent a CT scan which showed a small non-obstructing left lower pole stone.  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    Urine dipstick shows negative for all components.    Review of Systems   Constitutional: Negative for fever.   Eyes: Negative for visual disturbance.   Respiratory: Negative for shortness of breath.    Cardiovascular: Negative for chest pain.   Gastrointestinal: Negative for nausea and vomiting.   Genitourinary: Negative for dysuria and hematuria.   Musculoskeletal: Negative for gait problem.   Skin: Negative for rash.   Neurological: Negative for seizures.   Psychiatric/Behavioral: Negative for confusion.       Objective:      Physical Exam   Constitutional: He is oriented to person, place, and time. He appears well-developed and well-nourished.   HENT:   Head: Normocephalic and atraumatic.   Eyes: Conjunctivae are normal.   Cardiovascular: Normal rate.    Pulmonary/Chest: Effort normal.   Abdominal: Soft. He exhibits no distension. There is no tenderness. There is no CVA tenderness. Hernia confirmed negative in the right inguinal area and confirmed negative in the left inguinal area.   Genitourinary: Testes normal and penis normal.   Musculoskeletal: Normal range of motion. He exhibits no edema.   Lymphadenopathy: No inguinal adenopathy noted on the right or left side.   Neurological: He is alert and oriented to person, place, and time.   Skin: Skin is warm and dry. No rash noted.   Psychiatric: He has a normal mood and  affect.   Vitals reviewed.      Assessment:       1. Right flank pain    2. Kidney stone    3. Horseshoe kidney        Plan:       Right flank pain  -     POCT URINE DIPSTICK WITHOUT MICROSCOPE    Kidney stone    Horseshoe kidney      I suspected he passed a kidney stone whic caused his symptoms.  He has a small stone remaining 2-3 mm.  I recommend observation.  No treatment necessary for horseshoe kidney

## 2017-05-10 NOTE — LETTER
May 10, 2017      Edward Pink, NICHOLAS  [address] [address] [address]           [facility]  [address] [facility] [address] [place] [address]  Phone: [phone]          Patient: Young Haynes Jr.   MR Number: [id number]   YOB: 1970   Date of Visit: 5/10/2017       Dear Edward Pink:    Thank you for referring Young Haynes to me for evaluation. Attached you will find relevant portions of my assessment and plan of care.    If you have questions, please do not hesitate to call me. I look forward to following Young Haynes along with you.    Sincerely,    SHELTON Bowen MD    Enclosure  CC:  No Recipients    If you would like to receive this communication electronically, please contact [email or web address] or [phone] to request more information on [employer] access.    For providers and/or their staff who would like to refer a patient to [facility], please contact us through our one-stop-shop provider referral line, [facility] , at [phone].    If you feel you have received this communication in error or would no longer like to receive these types of communications, please e-mail [email or web address]

## 2018-04-11 ENCOUNTER — OFFICE VISIT (OUTPATIENT)
Dept: FAMILY MEDICINE | Facility: CLINIC | Age: 48
End: 2018-04-11
Payer: COMMERCIAL

## 2018-04-11 VITALS
TEMPERATURE: 98 F | BODY MASS INDEX: 29.66 KG/M2 | WEIGHT: 161.19 LBS | HEIGHT: 62 IN | HEART RATE: 72 BPM | DIASTOLIC BLOOD PRESSURE: 77 MMHG | SYSTOLIC BLOOD PRESSURE: 122 MMHG

## 2018-04-11 DIAGNOSIS — R42 VERTIGO: Primary | ICD-10-CM

## 2018-04-11 DIAGNOSIS — R73.9 HYPERGLYCEMIA: ICD-10-CM

## 2018-04-11 PROCEDURE — 99213 OFFICE O/P EST LOW 20 MIN: CPT | Mod: S$GLB,,, | Performed by: NURSE PRACTITIONER

## 2018-04-11 PROCEDURE — 99999 PR PBB SHADOW E&M-EST. PATIENT-LVL III: CPT | Mod: PBBFAC,,, | Performed by: NURSE PRACTITIONER

## 2018-04-11 RX ORDER — DIAZEPAM 5 MG/1
5 TABLET ORAL EVERY 8 HOURS PRN
Qty: 21 TABLET | Refills: 0 | Status: SHIPPED | OUTPATIENT
Start: 2018-04-11 | End: 2019-03-13

## 2018-04-11 RX ORDER — MECLIZINE HYDROCHLORIDE 25 MG/1
25 TABLET ORAL
COMMUNITY
Start: 2018-04-05 | End: 2018-04-11 | Stop reason: ALTCHOICE

## 2018-04-11 NOTE — PROGRESS NOTES
Subjective:      Patient ID: Young Haynes Jr. is a 47 y.o. male.    Chief Complaint: Hospital Follow Up    HPI   Transitional Care Note    Family and/or Caretaker present at visit?  No.  Diagnostic tests reviewed/disposition: No diagnosic tests pending after this hospitalization.  Disease/illness education: Vertigo, chest pain-resolved, and GERD  Home health/community services discussion/referrals: Patient does not have home health established from hospital visit.  They do not need home health.  If needed, we will set up home health for the patient.   Establishment or re-establishment of referral orders for community resources: No other necessary community resources.   Discussion with other health care providers: will refer to ENT.     Patient to clinic following hospitalization at [place] last week.  He reports he went to [place] ER for extreme dizziness and then began to feel nausea with a pressure in his epigastric area.  He had a negative cardiac work-up that included EKG, Stress test, cardiac enzymes.  He also had a negative CT of the head.  He reports his chest discomfort resolved while at the hospital and he has not had any before or since this episode.  He was noted to have an elevated glucose while in hospital and his A1C was 6.1%.  He voices he was given meclizine for his dizziness and this has not improved his symptoms.  He admits to tinnitus in right ear.  He describes the dizziness at the room spinning and it is exacerbated by quick movements.  The dizziness lasts several second and then resolves.  He is not experiencing nausea with the dizziness anymore.  Dizziness is ongoing x 1 week.  He cannot identify any other exacerbating or mitigating factors.    Review of Systems   Constitutional: Negative for chills, fatigue and fever.   HENT: Positive for tinnitus (right ear).    Respiratory: Negative for cough, shortness of breath and wheezing.    Cardiovascular: Negative for chest pain,  "palpitations and leg swelling.   Gastrointestinal: Negative for nausea.   Skin: Negative for rash and wound.   Neurological: Positive for dizziness. Negative for weakness and numbness.   Psychiatric/Behavioral: The patient is not nervous/anxious.        Objective:     /77   Pulse 72   Temp 98.2 °F (36.8 °C) (Oral)   Ht 5' 2" (1.575 m)   Wt 73.1 kg (161 lb 2.5 oz)   BMI 29.48 kg/m²     Physical Exam   Constitutional: He is oriented to person, place, and time. He appears well-developed and well-nourished.   HENT:   Head: Normocephalic.   Right Ear: Tympanic membrane is erythematous (very mildly erythematous).   Left Ear: Tympanic membrane is erythematous (very mildly erythematous).   Nose: Rhinorrhea present. No mucosal edema. Right sinus exhibits no maxillary sinus tenderness and no frontal sinus tenderness. Left sinus exhibits no maxillary sinus tenderness and no frontal sinus tenderness.   Mouth/Throat: Oropharynx is clear and moist.   Eyes: Pupils are equal, round, and reactive to light.   Cardiovascular: Normal rate, regular rhythm and normal heart sounds.    Pulmonary/Chest: Effort normal and breath sounds normal. No respiratory distress. He has no decreased breath sounds. He has no wheezes. He has no rhonchi. He has no rales.   Lymphadenopathy:     He has no cervical adenopathy.   Neurological: He is alert and oriented to person, place, and time.   Negative martine-hallpike maneuver    Skin: Skin is warm and dry. No rash noted.   Psychiatric: He has a normal mood and affect. His behavior is normal. Judgment and thought content normal.   Vitals reviewed.    Assessment:     1. Vertigo    2. Hyperglycemia        Plan:     Problem List Items Addressed This Visit     None      Visit Diagnoses     Vertigo    -  Primary    Relevant Medications    diazePAM (VALIUM) 5 MG tablet    Other Relevant Orders    Ambulatory referral to ENT    Hyperglycemia          Will try valium short term since meclizine is not " working-advised patient to d/c meclizine at this time  Patient will follow up for annual exam and at that time we will re-evaluate his fasting glucose  In the meantime, I have advised patient to eat a healthy diet low in starchy, sugary, and high carbohydrate foods  Will see ENT for further evaluation of vertigo  The patient was checked in the [facility] Board of Pharmacy's  Prescription Monitoring Program. There appears to be no incongruities with the patient's verbalized history.     Follow-up in about 2 weeks (around 4/25/2018), or if symptoms worsen or fail to improve, for annual exam.

## 2018-04-11 NOTE — PATIENT INSTRUCTIONS
Do not drive or operate machinery while taking Valium    Dizziness (Uncertain Cause)  Dizziness is a common symptom. It may be described as lightheadedness, spinning, or feeling like you are going to faint. Dizziness can have many causes.  Be sure to tell the healthcare provider about:  · All medicines you take, including prescription, over-the-counter, herbs, and supplements  · Any other symptoms you have  · Any health problems you are being treated for  · Anything that causes the dizziness to get worse or better  Today's exam did not show an exact cause for your dizziness. Other tests may be needed. Follow up with your healthcare provider.  Home care  · Dizziness that occurs with sudden standing may be a sign of mild dehydration. Drink extra fluids for the next few days.  · If you recently started a new medicine, stopped a medicine, or had the dose of a current medicine changed, talk with the prescribing healthcare provider. Your medicine plan may need adjustment.  · If dizziness lasts more than a few seconds, sit or lie down until it passes. This may help prevent injury in case you pass out.  · Do not drive or use power tools or dangerous equipment until you have had no dizziness for at least 48 hours.  Follow-up care  Follow up with your healthcare provider for further evaluation within the next 7 days or as advised.  When to seek medical advice  Call your healthcare provider for any of the following:  · Worsening of symptoms or new symptoms  · Passing out or seizure  · Repeated vomiting  · Headache  · Palpitations (the sense that your heart is fluttering or beating fast or hard)  · Shortness of breath  · Blood in vomit or stool (black or red color)  · Weakness of an arm or leg or one side of the face  · Vision or hearing changes  · Trouble walking or speaking  · Chest, arm, neck, back, or jaw pain  Date Last Reviewed: 8/23/2015  © 0765-1923 [employer]. [address] [place] [address], [place] [address].  All rights reserved. This information is not intended as a substitute for professional medical care. Always follow your healthcare professional's instructions.

## 2018-09-13 DIAGNOSIS — K21.9 GASTROESOPHAGEAL REFLUX DISEASE WITHOUT ESOPHAGITIS: ICD-10-CM

## 2018-09-13 RX ORDER — PANTOPRAZOLE SODIUM 40 MG/1
TABLET, DELAYED RELEASE ORAL
Qty: 90 TABLET | Refills: 2 | Status: SHIPPED | OUTPATIENT
Start: 2018-09-13 | End: 2019-10-31 | Stop reason: SDUPTHER

## 2019-03-13 ENCOUNTER — OFFICE VISIT (OUTPATIENT)
Dept: FAMILY MEDICINE | Facility: CLINIC | Age: 49
End: 2019-03-13
Payer: COMMERCIAL

## 2019-03-13 VITALS
BODY MASS INDEX: 31.43 KG/M2 | HEIGHT: 62 IN | TEMPERATURE: 101 F | SYSTOLIC BLOOD PRESSURE: 126 MMHG | DIASTOLIC BLOOD PRESSURE: 78 MMHG | HEART RATE: 110 BPM | WEIGHT: 170.81 LBS

## 2019-03-13 DIAGNOSIS — J10.1 INFLUENZA A: Primary | ICD-10-CM

## 2019-03-13 DIAGNOSIS — R05.9 COUGH: ICD-10-CM

## 2019-03-13 DIAGNOSIS — R50.9 FEVER, UNSPECIFIED FEVER CAUSE: ICD-10-CM

## 2019-03-13 DIAGNOSIS — J02.9 PHARYNGITIS, UNSPECIFIED ETIOLOGY: ICD-10-CM

## 2019-03-13 LAB
DEPRECATED S PYO AG THROAT QL EIA: NEGATIVE
INFLUENZA A, MOLECULAR: POSITIVE
INFLUENZA B, MOLECULAR: NEGATIVE
SPECIMEN SOURCE: ABNORMAL

## 2019-03-13 PROCEDURE — 3008F PR BODY MASS INDEX (BMI) DOCUMENTED: ICD-10-PCS | Mod: CPTII,S$GLB,, | Performed by: NURSE PRACTITIONER

## 2019-03-13 PROCEDURE — 99213 OFFICE O/P EST LOW 20 MIN: CPT | Mod: S$GLB,,, | Performed by: NURSE PRACTITIONER

## 2019-03-13 PROCEDURE — 87502 INFLUENZA DNA AMP PROBE: CPT | Mod: PO

## 2019-03-13 PROCEDURE — 87081 CULTURE SCREEN ONLY: CPT

## 2019-03-13 PROCEDURE — 3008F BODY MASS INDEX DOCD: CPT | Mod: CPTII,S$GLB,, | Performed by: NURSE PRACTITIONER

## 2019-03-13 PROCEDURE — 99999 PR PBB SHADOW E&M-EST. PATIENT-LVL IV: ICD-10-PCS | Mod: PBBFAC,,, | Performed by: NURSE PRACTITIONER

## 2019-03-13 PROCEDURE — 87880 STREP A ASSAY W/OPTIC: CPT | Mod: PO

## 2019-03-13 PROCEDURE — 99213 PR OFFICE/OUTPT VISIT, EST, LEVL III, 20-29 MIN: ICD-10-PCS | Mod: S$GLB,,, | Performed by: NURSE PRACTITIONER

## 2019-03-13 PROCEDURE — 99999 PR PBB SHADOW E&M-EST. PATIENT-LVL IV: CPT | Mod: PBBFAC,,, | Performed by: NURSE PRACTITIONER

## 2019-03-13 RX ORDER — OSELTAMIVIR PHOSPHATE 75 MG/1
75 CAPSULE ORAL 2 TIMES DAILY
Qty: 10 CAPSULE | Refills: 0 | Status: SHIPPED | OUTPATIENT
Start: 2019-03-13 | End: 2019-03-18

## 2019-03-13 RX ORDER — PROMETHAZINE HYDROCHLORIDE AND DEXTROMETHORPHAN HYDROBROMIDE 6.25; 15 MG/5ML; MG/5ML
5 SYRUP ORAL 2 TIMES DAILY PRN
Qty: 118 ML | Refills: 0 | Status: SHIPPED | OUTPATIENT
Start: 2019-03-13 | End: 2019-03-23

## 2019-03-13 NOTE — PATIENT INSTRUCTIONS
Hydrate well  Alternate tylenol and motrin every 4 h  Rest  Report to ER immediately if symptoms worsen

## 2019-03-13 NOTE — PROGRESS NOTES
Subjective:       Patient ID: Young Haynes Jr. is a 48 y.o. male.    Chief Complaint: Fever; Cough; Headache; and Nasal Congestion    Influenza   This is a new problem. The current episode started yesterday. The problem occurs daily. The problem has been unchanged. Associated symptoms include congestion, coughing, a fever, myalgias and a sore throat. Pertinent negatives include no abdominal pain, anorexia, arthralgias, change in bowel habit, chest pain, chills, diaphoresis, fatigue, headaches, joint swelling, nausea, neck pain, numbness, rash, swollen glands, urinary symptoms, vertigo, visual change, vomiting or weakness. Nothing aggravates the symptoms. He has tried nothing for the symptoms. The treatment provided no relief.     Past Medical History:   Diagnosis Date    GERD (gastroesophageal reflux disease)      Social History     Socioeconomic History    Marital status:      Spouse name: Not on file    Number of children: Not on file    Years of education: Not on file    Highest education level: Not on file   Social Needs    Financial resource strain: Not on file    Food insecurity - worry: Not on file    Food insecurity - inability: Not on file    Transportation needs - medical: Not on file    Transportation needs - non-medical: Not on file   Occupational History     Employer: [employer]   Tobacco Use    Smoking status: Never Smoker    Smokeless tobacco: Never Used   Substance and Sexual Activity    Alcohol use: No    Drug use: No    Sexual activity: Not on file   Other Topics Concern    Not on file   Social History Narrative    Not on file     History reviewed. No pertinent surgical history.    Review of Systems   Constitutional: Positive for fever. Negative for chills, diaphoresis and fatigue.   HENT: Positive for congestion and sore throat.    Eyes: Negative.    Respiratory: Positive for cough.    Cardiovascular: Negative.  Negative for chest pain.   Gastrointestinal: Negative.   Negative for abdominal pain, anorexia, change in bowel habit, nausea and vomiting.   Endocrine: Negative.    Genitourinary: Negative.    Musculoskeletal: Positive for myalgias. Negative for arthralgias, joint swelling and neck pain.   Skin: Negative.  Negative for rash.   Allergic/Immunologic: Negative.    Neurological: Negative.  Negative for vertigo, weakness, numbness and headaches.   Psychiatric/Behavioral: Negative.        Objective:      Physical Exam   Constitutional: He is oriented to person, place, and time. He appears well-developed and well-nourished.   HENT:   Head: Normocephalic.   Right Ear: External ear normal.   Left Ear: External ear normal.   Nose: Nose normal.   Mouth/Throat: Oropharynx is clear and moist.   Eyes: Conjunctivae are normal. Pupils are equal, round, and reactive to light.   Neck: Normal range of motion. Neck supple.   Cardiovascular: Normal rate, regular rhythm and normal heart sounds.   Pulmonary/Chest: Effort normal and breath sounds normal.   Abdominal: Soft. Bowel sounds are normal.   Musculoskeletal: Normal range of motion.   Neurological: He is alert and oriented to person, place, and time.   Skin: Skin is warm and dry. Capillary refill takes 2 to 3 seconds.   Psychiatric: He has a normal mood and affect. His behavior is normal. Judgment and thought content normal.   Nursing note and vitals reviewed.      Assessment:       1. Influenza A    2. Fever, unspecified fever cause    3. Cough    4. Pharyngitis, unspecified etiology        Plan:           Young was seen today for fever, cough, headache and nasal congestion.    Diagnoses and all orders for this visit:    Influenza A  Fever, unspecified fever cause  Cough  Pharyngitis, unspecified etiology  -     Influenza A & B by Molecular  -     Throat Screen, Rapid  -     promethazine-dextromethorphan (PROMETHAZINE-DM) 6.25-15 mg/5 mL Syrp; Take 5 mLs by mouth 2 (two) times daily as needed.  -     Strep A culture, throat  -      oseltamivir (TAMIFLU) 75 MG capsule; Take 1 capsule (75 mg total) by mouth 2 (two) times daily. for 5 days  Hydrate well  Alternate tylenol and motrin every 4 h  Rest  Report to ER immediately if symptoms worsen

## 2019-03-16 LAB — BACTERIA THROAT CULT: NORMAL

## 2019-10-31 DIAGNOSIS — K21.9 GASTROESOPHAGEAL REFLUX DISEASE WITHOUT ESOPHAGITIS: ICD-10-CM

## 2019-11-01 RX ORDER — PANTOPRAZOLE SODIUM 40 MG/1
TABLET, DELAYED RELEASE ORAL
Qty: 90 TABLET | Refills: 1 | Status: SHIPPED | OUTPATIENT
Start: 2019-11-01 | End: 2020-04-22 | Stop reason: SDUPTHER

## 2019-11-01 NOTE — TELEPHONE ENCOUNTER
The patient is overdue to see me as a provider to maintain me as PCP in the clinic by [facility] because others have been providing care to the patient.  Please arrange for the patient to see me in the near future to update meds/labs and to maintain current patient status.  It if appears to be appropriate, a telehealth visit may be used for this.  I have refilled the requested medicine that prompted this review x 1.

## 2020-01-02 ENCOUNTER — LAB VISIT (OUTPATIENT)
Dept: LAB | Facility: HOSPITAL | Age: 50
End: 2020-01-02
Attending: PEDIATRICS
Payer: COMMERCIAL

## 2020-01-02 DIAGNOSIS — Z52.001 STEM CELL DONOR: Primary | ICD-10-CM

## 2020-01-02 DIAGNOSIS — Z52.001 STEM CELL DONOR: ICD-10-CM

## 2020-01-02 PROCEDURE — 81373 HLA I TYPING 1 LOCUS LR: CPT | Mod: 91,PO

## 2020-01-02 PROCEDURE — 81376 HLA II TYPING 1 LOCUS LR: CPT | Mod: 91,PO

## 2020-01-02 PROCEDURE — 81376 HLA II TYPING 1 LOCUS LR: CPT | Mod: PO,59

## 2020-01-02 PROCEDURE — 81373 HLA I TYPING 1 LOCUS LR: CPT | Mod: PO

## 2020-01-10 LAB — HLATY INTERPRETATION: NORMAL

## 2020-01-11 LAB
HLA DRB4 1: NORMAL
HLA SSO DNA TYPING CLASS I & II INTERPRETATION: NORMAL
HLA-A 1 SERO. EQUIV: 3
HLA-A 1: NORMAL
HLA-A 2 SERO. EQUIV: 68
HLA-A 2: NORMAL
HLA-B 1 SERO. EQUIV: 8
HLA-B 1: NORMAL
HLA-B 2 SERO. EQUIV: 52
HLA-B 2: NORMAL
HLA-BW 1 SERO. EQUIV: 6
HLA-BW 2 SERO. EQUIV: 4
HLA-C 1: NORMAL
HLA-C 2: NORMAL
HLA-CW 1 SERO. EQUIV: 7
HLA-CW 2 SERO. EQUIV: 15
HLA-DQ 1 SERO. EQUIV: 2
HLA-DQ 2 SERO. EQUIV: 8
HLA-DQB1 1: NORMAL
HLA-DQB1 2: NORMAL
HLA-DRB1 1 SERO. EQUIV: 17
HLA-DRB1 1: NORMAL
HLA-DRB1 2 SERO. EQUIV: 4
HLA-DRB1 2: NORMAL
HLA-DRB3 1: NORMAL
HLA-DRB3 2: NORMAL
HLA-DRB345 1 SERO. EQUIV: 52
HLA-DRB345 2 SERO. EQUIV: 53
HLA-DRB4 2: NORMAL
HLA-DRB5 1: NORMAL
HLA-DRB5 2: NORMAL
SSDQB TESTING DATE: NORMAL
SSDRB TESTING DATE: NORMAL
SSOA TESTING DATE: NORMAL
SSOB TESTING DATE: NORMAL
SSOC TESTING DATE: NORMAL
SSODR TESTING DATE: NORMAL
TYSSO TESTING DATE: NORMAL

## 2020-04-22 DIAGNOSIS — K21.9 GASTROESOPHAGEAL REFLUX DISEASE WITHOUT ESOPHAGITIS: ICD-10-CM

## 2020-04-22 RX ORDER — PANTOPRAZOLE SODIUM 40 MG/1
TABLET, DELAYED RELEASE ORAL
Qty: 90 TABLET | Refills: 0 | Status: SHIPPED | OUTPATIENT
Start: 2020-04-22 | End: 2020-07-18

## 2020-07-18 DIAGNOSIS — K21.9 GASTROESOPHAGEAL REFLUX DISEASE WITHOUT ESOPHAGITIS: ICD-10-CM

## 2020-07-18 RX ORDER — PANTOPRAZOLE SODIUM 40 MG/1
TABLET, DELAYED RELEASE ORAL
Qty: 90 TABLET | Refills: 1 | Status: SHIPPED | OUTPATIENT
Start: 2020-07-18 | End: 2021-05-19 | Stop reason: SDUPTHER

## 2020-12-07 ENCOUNTER — HOSPITAL ENCOUNTER (OUTPATIENT)
Dept: RADIOLOGY | Facility: HOSPITAL | Age: 50
Discharge: HOME | End: 2020-12-07
Attending: NURSE PRACTITIONER
Payer: COMMERCIAL

## 2020-12-07 ENCOUNTER — OFFICE VISIT (OUTPATIENT)
Dept: FAMILY MEDICINE | Facility: CLINIC | Age: 50
End: 2020-12-07
Payer: COMMERCIAL

## 2020-12-07 VITALS
HEART RATE: 81 BPM | SYSTOLIC BLOOD PRESSURE: 134 MMHG | BODY MASS INDEX: 32.27 KG/M2 | TEMPERATURE: 98 F | HEIGHT: 62 IN | DIASTOLIC BLOOD PRESSURE: 83 MMHG | WEIGHT: 175.38 LBS

## 2020-12-07 DIAGNOSIS — R10.32 LLQ PAIN: ICD-10-CM

## 2020-12-07 DIAGNOSIS — M54.50 LOW BACK PAIN, UNSPECIFIED BACK PAIN LATERALITY, UNSPECIFIED CHRONICITY, UNSPECIFIED WHETHER SCIATICA PRESENT: Primary | ICD-10-CM

## 2020-12-07 LAB
BILIRUB UR QL STRIP: NEGATIVE
CLARITY UR: CLEAR
COLOR UR: YELLOW
GLUCOSE UR QL STRIP: NEGATIVE
HGB UR QL STRIP: NEGATIVE
KETONES UR QL STRIP: NEGATIVE
LEUKOCYTE ESTERASE UR QL STRIP: NEGATIVE
NITRITE UR QL STRIP: NEGATIVE
PH UR STRIP: 7 [PH] (ref 5–8)
PROT UR QL STRIP: NEGATIVE
SP GR UR STRIP: 1.01 (ref 1–1.03)
URN SPEC COLLECT METH UR: NORMAL

## 2020-12-07 PROCEDURE — 99999 PR PBB SHADOW E&M-EST. PATIENT-LVL III: CPT | Mod: PBBFAC,,, | Performed by: NURSE PRACTITIONER

## 2020-12-07 PROCEDURE — 3008F PR BODY MASS INDEX (BMI) DOCUMENTED: ICD-10-PCS | Mod: CPTII,S$GLB,, | Performed by: NURSE PRACTITIONER

## 2020-12-07 PROCEDURE — 81002 URINALYSIS NONAUTO W/O SCOPE: CPT | Mod: PO

## 2020-12-07 PROCEDURE — 87086 URINE CULTURE/COLONY COUNT: CPT

## 2020-12-07 PROCEDURE — 1125F AMNT PAIN NOTED PAIN PRSNT: CPT | Mod: S$GLB,,, | Performed by: NURSE PRACTITIONER

## 2020-12-07 PROCEDURE — 74019 RADEX ABDOMEN 2 VIEWS: CPT | Mod: 26,,, | Performed by: RADIOLOGY

## 2020-12-07 PROCEDURE — 3008F BODY MASS INDEX DOCD: CPT | Mod: CPTII,S$GLB,, | Performed by: NURSE PRACTITIONER

## 2020-12-07 PROCEDURE — 74019 RADEX ABDOMEN 2 VIEWS: CPT | Mod: TC,PO

## 2020-12-07 PROCEDURE — 74019 XR ABDOMEN FLAT AND ERECT: ICD-10-PCS | Mod: 26,,, | Performed by: RADIOLOGY

## 2020-12-07 PROCEDURE — 1125F PR PAIN SEVERITY QUANTIFIED, PAIN PRESENT: ICD-10-PCS | Mod: S$GLB,,, | Performed by: NURSE PRACTITIONER

## 2020-12-07 PROCEDURE — 99999 PR PBB SHADOW E&M-EST. PATIENT-LVL III: ICD-10-PCS | Mod: PBBFAC,,, | Performed by: NURSE PRACTITIONER

## 2020-12-07 PROCEDURE — 99213 OFFICE O/P EST LOW 20 MIN: CPT | Mod: S$GLB,,, | Performed by: NURSE PRACTITIONER

## 2020-12-07 PROCEDURE — 99213 PR OFFICE/OUTPT VISIT, EST, LEVL III, 20-29 MIN: ICD-10-PCS | Mod: S$GLB,,, | Performed by: NURSE PRACTITIONER

## 2020-12-07 RX ORDER — AMOXICILLIN AND CLAVULANATE POTASSIUM 875; 125 MG/1; MG/1
1 TABLET, FILM COATED ORAL EVERY 12 HOURS
Qty: 20 TABLET | Refills: 0 | Status: SHIPPED | OUTPATIENT
Start: 2020-12-07 | End: 2020-12-17

## 2020-12-07 RX ORDER — ONDANSETRON HYDROCHLORIDE 8 MG/1
8 TABLET, FILM COATED ORAL EVERY 8 HOURS PRN
Qty: 15 TABLET | Refills: 0 | Status: SHIPPED | OUTPATIENT
Start: 2020-12-07 | End: 2020-12-12

## 2020-12-07 RX ORDER — TRAMADOL HYDROCHLORIDE 50 MG/1
50 TABLET ORAL EVERY 8 HOURS PRN
Qty: 15 EACH | Refills: 0 | Status: SHIPPED | OUTPATIENT
Start: 2020-12-07 | End: 2020-12-12

## 2020-12-07 NOTE — PROGRESS NOTES
Subjective:       Patient ID: Young Haynes Jr. is a 50 y.o. male.    Chief Complaint: Abdominal Pain and Back Pain    Abdominal Pain  This is a new problem. The current episode started in the past 7 days. The onset quality is undetermined. The problem occurs daily. The problem has been unchanged. The pain is located in the LLQ. The pain is moderate. The quality of the pain is aching. The abdominal pain radiates to the back. Associated symptoms include nausea. Pertinent negatives include no anorexia, arthralgias, belching, constipation, diarrhea, dysuria, fever, flatus, frequency, headaches, hematochezia, hematuria, melena, myalgias, vomiting or weight loss. The pain is aggravated by eating. The pain is relieved by nothing. He has tried nothing for the symptoms. The treatment provided no relief. There is no history of abdominal surgery, colon cancer, Crohn's disease, gallstones, GERD, irritable bowel syndrome, pancreatitis, PUD or ulcerative colitis. Patient's medical history does not include kidney stones and UTI.     Past Medical History:   Diagnosis Date    GERD (gastroesophageal reflux disease)      Social History     Socioeconomic History    Marital status:      Spouse name: Not on file    Number of children: Not on file    Years of education: Not on file    Highest education level: Not on file   Occupational History     Employer: [employer]   Social Needs    Financial resource strain: Not on file    Food insecurity     Worry: Not on file     Inability: Not on file    Transportation needs     Medical: Not on file     Non-medical: Not on file   Tobacco Use    Smoking status: Never Smoker    Smokeless tobacco: Never Used   Substance and Sexual Activity    Alcohol use: No    Drug use: No    Sexual activity: Not on file   Lifestyle    Physical activity     Days per week: Not on file     Minutes per session: Not on file    Stress: Not on file   Relationships    Social connections     Talks on  phone: Not on file     Gets together: Not on file     Attends Je[employer]'s witness service: Not on file     Active member of club or organization: Not on file     Attends meetings of clubs or organizations: Not on file     Relationship status: Not on file   Other Topics Concern    Not on file   Social History Narrative    Not on file     History reviewed. No pertinent surgical history.    Review of Systems   Constitutional: Negative.  Negative for fever and weight loss.   HENT: Negative.    Eyes: Negative.    Respiratory: Negative.    Cardiovascular: Negative.    Gastrointestinal: Positive for abdominal pain and nausea. Negative for anorexia, constipation, diarrhea, flatus, hematochezia, melena and vomiting.   Endocrine: Negative.    Genitourinary: Negative.  Negative for dysuria, frequency and hematuria.   Musculoskeletal: Negative.  Negative for arthralgias and myalgias.   Integumentary:  Negative.   Allergic/Immunologic: Negative.    Neurological: Negative.  Negative for headaches.   Psychiatric/Behavioral: Negative.          Objective:      Physical Exam  Vitals signs and nursing note reviewed.   Constitutional:       Appearance: Normal appearance.   HENT:      Head: Normocephalic.      Right Ear: Tympanic membrane, ear canal and external ear normal.      Left Ear: Tympanic membrane, ear canal and external ear normal.      Nose: Nose normal.      Mouth/Throat:      Mouth: Mucous membranes are moist.      Pharynx: Oropharynx is clear.   Eyes:      Conjunctiva/sclera: Conjunctivae normal.      Pupils: Pupils are equal, round, and reactive to light.   Neck:      Musculoskeletal: Normal range of motion and neck supple.   Cardiovascular:      Rate and Rhythm: Normal rate and regular rhythm.      Pulses: Normal pulses.      Heart sounds: Normal heart sounds.   Pulmonary:      Effort: Pulmonary effort is normal.      Breath sounds: Normal breath sounds.   Abdominal:      General: Bowel sounds are normal.      Palpations:  Abdomen is soft.      Tenderness: There is abdominal tenderness in the left lower quadrant. There is no right CVA tenderness, left CVA tenderness, guarding or rebound. Negative signs include Briseno's sign, Rovsing's sign, McBurney's sign, psoas sign and obturator sign.   Musculoskeletal: Normal range of motion.   Skin:     General: Skin is warm and dry.      Capillary Refill: Capillary refill takes 2 to 3 seconds.   Neurological:      Mental Status: He is alert and oriented to person, place, and time.   Psychiatric:         Mood and Affect: Mood normal.         Behavior: Behavior normal.         Thought Content: Thought content normal.         Judgment: Judgment normal.         Assessment:       1. Low back pain, unspecified back pain laterality, unspecified chronicity, unspecified whether sciatica present    2. LLQ pain        Plan:           Young was seen today for abdominal pain and back pain.    Diagnoses and all orders for this visit:    Low back pain, unspecified back pain laterality, unspecified chronicity, unspecified whether sciatica present  LLQ pain  -     Urinalysis  -     Urine culture; Future  -     Urine culture  -     X-Ray Abdomen Flat And Erect; Future  -     amoxicillin-clavulanate 875-125mg (AUGMENTIN) 875-125 mg per tablet; Take 1 tablet by mouth every 12 (twelve) hours. for 10 days  -     ondansetron (ZOFRAN) 8 MG tablet; Take 1 tablet (8 mg total) by mouth every 8 (eight) hours as needed.  Ultram request sent to PCP to approve  Report to ER immediately if symptoms worsen

## 2020-12-08 ENCOUNTER — TELEPHONE (OUTPATIENT)
Dept: FAMILY MEDICINE | Facility: CLINIC | Age: 50
End: 2020-12-08

## 2020-12-08 LAB — BACTERIA UR CULT: NO GROWTH

## 2020-12-08 NOTE — TELEPHONE ENCOUNTER
----- Message from Mojgan Ibrahim sent at 12/8/2020  3:11 PM CST -----  Contact: Jessica/wife  Pt wife called in regarding speaking to the nurse about her 's results. Please give her a call back at [phone].    Thanks,  Pb

## 2021-01-14 ENCOUNTER — TELEPHONE (OUTPATIENT)
Dept: FAMILY MEDICINE | Facility: CLINIC | Age: 51
End: 2021-01-14

## 2021-01-14 DIAGNOSIS — R10.13 EPIGASTRIC PAIN: Primary | ICD-10-CM

## 2021-01-15 ENCOUNTER — LAB VISIT (OUTPATIENT)
Dept: LAB | Facility: HOSPITAL | Age: 51
End: 2021-01-15
Attending: FAMILY MEDICINE
Payer: COMMERCIAL

## 2021-01-15 DIAGNOSIS — R10.13 EPIGASTRIC PAIN: ICD-10-CM

## 2021-01-15 PROCEDURE — 36415 COLL VENOUS BLD VENIPUNCTURE: CPT | Mod: PO

## 2021-01-15 PROCEDURE — 86677 HELICOBACTER PYLORI ANTIBODY: CPT

## 2021-01-19 LAB — H PYLORI IGG SERPL QL IA: NEGATIVE

## 2021-01-20 ENCOUNTER — TELEPHONE (OUTPATIENT)
Dept: FAMILY MEDICINE | Facility: CLINIC | Age: 51
End: 2021-01-20

## 2021-03-11 ENCOUNTER — TELEPHONE (OUTPATIENT)
Dept: FAMILY MEDICINE | Facility: CLINIC | Age: 51
End: 2021-03-11

## 2021-03-12 ENCOUNTER — TELEPHONE (OUTPATIENT)
Dept: FAMILY MEDICINE | Facility: CLINIC | Age: 51
End: 2021-03-12

## 2021-03-12 RX ORDER — AMOXICILLIN AND CLAVULANATE POTASSIUM 875; 125 MG/1; MG/1
1 TABLET, FILM COATED ORAL EVERY 12 HOURS
Qty: 14 TABLET | Refills: 0 | Status: SHIPPED | OUTPATIENT
Start: 2021-03-12 | End: 2021-03-19

## 2021-05-06 ENCOUNTER — PATIENT MESSAGE (OUTPATIENT)
Dept: RESEARCH | Facility: HOSPITAL | Age: 51
End: 2021-05-06

## 2021-05-19 DIAGNOSIS — K21.9 GASTROESOPHAGEAL REFLUX DISEASE WITHOUT ESOPHAGITIS: ICD-10-CM

## 2021-05-19 RX ORDER — PANTOPRAZOLE SODIUM 40 MG/1
40 TABLET, DELAYED RELEASE ORAL DAILY
Qty: 90 TABLET | Refills: 1 | Status: SHIPPED | OUTPATIENT
Start: 2021-05-19 | End: 2021-11-12 | Stop reason: SDUPTHER

## 2022-03-14 ENCOUNTER — LAB VISIT (OUTPATIENT)
Dept: LAB | Facility: HOSPITAL | Age: 52
End: 2022-03-14
Attending: FAMILY MEDICINE
Payer: COMMERCIAL

## 2022-03-14 ENCOUNTER — TELEPHONE (OUTPATIENT)
Dept: FAMILY MEDICINE | Facility: CLINIC | Age: 52
End: 2022-03-14
Payer: COMMERCIAL

## 2022-03-14 ENCOUNTER — OFFICE VISIT (OUTPATIENT)
Dept: FAMILY MEDICINE | Facility: CLINIC | Age: 52
End: 2022-03-14
Payer: COMMERCIAL

## 2022-03-14 VITALS
BODY MASS INDEX: 31.83 KG/M2 | HEART RATE: 77 BPM | SYSTOLIC BLOOD PRESSURE: 130 MMHG | TEMPERATURE: 98 F | DIASTOLIC BLOOD PRESSURE: 86 MMHG | WEIGHT: 173 LBS | HEIGHT: 62 IN

## 2022-03-14 DIAGNOSIS — Z13.220 ENCOUNTER FOR LIPID SCREENING FOR CARDIOVASCULAR DISEASE: ICD-10-CM

## 2022-03-14 DIAGNOSIS — Z12.5 ENCOUNTER FOR PROSTATE CANCER SCREENING: ICD-10-CM

## 2022-03-14 DIAGNOSIS — Z12.11 COLON CANCER SCREENING: ICD-10-CM

## 2022-03-14 DIAGNOSIS — Z23 IMMUNIZATION DUE: ICD-10-CM

## 2022-03-14 DIAGNOSIS — K21.9 GASTROESOPHAGEAL REFLUX DISEASE WITHOUT ESOPHAGITIS: ICD-10-CM

## 2022-03-14 DIAGNOSIS — R93.2 ABNORMAL CT SCAN, GALLBLADDER: ICD-10-CM

## 2022-03-14 DIAGNOSIS — Z00.00 ANNUAL PHYSICAL EXAM: ICD-10-CM

## 2022-03-14 DIAGNOSIS — Z13.6 ENCOUNTER FOR LIPID SCREENING FOR CARDIOVASCULAR DISEASE: ICD-10-CM

## 2022-03-14 DIAGNOSIS — Z00.00 ANNUAL PHYSICAL EXAM: Primary | ICD-10-CM

## 2022-03-14 LAB
CHOLEST SERPL-MCNC: 164 MG/DL (ref 120–199)
CHOLEST/HDLC SERPL: 3 {RATIO} (ref 2–5)
COMPLEXED PSA SERPL-MCNC: 2.5 NG/ML (ref 0–4)
HDLC SERPL-MCNC: 54 MG/DL (ref 40–75)
HDLC SERPL: 32.9 % (ref 20–50)
LDLC SERPL CALC-MCNC: 97.2 MG/DL (ref 63–159)
NONHDLC SERPL-MCNC: 110 MG/DL
TRIGL SERPL-MCNC: 64 MG/DL (ref 30–150)

## 2022-03-14 PROCEDURE — 84153 ASSAY OF PSA TOTAL: CPT | Performed by: FAMILY MEDICINE

## 2022-03-14 PROCEDURE — 1159F PR MEDICATION LIST DOCUMENTED IN MEDICAL RECORD: ICD-10-PCS | Mod: CPTII,S$GLB,, | Performed by: FAMILY MEDICINE

## 2022-03-14 PROCEDURE — 3075F SYST BP GE 130 - 139MM HG: CPT | Mod: CPTII,S$GLB,, | Performed by: FAMILY MEDICINE

## 2022-03-14 PROCEDURE — 90750 ZOSTER RECOMBINANT VACCINE: ICD-10-PCS | Mod: S$GLB,,, | Performed by: FAMILY MEDICINE

## 2022-03-14 PROCEDURE — 3079F PR MOST RECENT DIASTOLIC BLOOD PRESSURE 80-89 MM HG: ICD-10-PCS | Mod: CPTII,S$GLB,, | Performed by: FAMILY MEDICINE

## 2022-03-14 PROCEDURE — 3008F PR BODY MASS INDEX (BMI) DOCUMENTED: ICD-10-PCS | Mod: CPTII,S$GLB,, | Performed by: FAMILY MEDICINE

## 2022-03-14 PROCEDURE — 3075F PR MOST RECENT SYSTOLIC BLOOD PRESS GE 130-139MM HG: ICD-10-PCS | Mod: CPTII,S$GLB,, | Performed by: FAMILY MEDICINE

## 2022-03-14 PROCEDURE — 36415 COLL VENOUS BLD VENIPUNCTURE: CPT | Mod: PO | Performed by: FAMILY MEDICINE

## 2022-03-14 PROCEDURE — 99999 PR PBB SHADOW E&M-EST. PATIENT-LVL IV: ICD-10-PCS | Mod: PBBFAC,,, | Performed by: FAMILY MEDICINE

## 2022-03-14 PROCEDURE — 1159F MED LIST DOCD IN RCRD: CPT | Mod: CPTII,S$GLB,, | Performed by: FAMILY MEDICINE

## 2022-03-14 PROCEDURE — 80061 LIPID PANEL: CPT | Performed by: FAMILY MEDICINE

## 2022-03-14 PROCEDURE — 3008F BODY MASS INDEX DOCD: CPT | Mod: CPTII,S$GLB,, | Performed by: FAMILY MEDICINE

## 2022-03-14 PROCEDURE — 87389 HIV-1 AG W/HIV-1&-2 AB AG IA: CPT | Performed by: FAMILY MEDICINE

## 2022-03-14 PROCEDURE — 3079F DIAST BP 80-89 MM HG: CPT | Mod: CPTII,S$GLB,, | Performed by: FAMILY MEDICINE

## 2022-03-14 PROCEDURE — 99999 PR PBB SHADOW E&M-EST. PATIENT-LVL IV: CPT | Mod: PBBFAC,,, | Performed by: FAMILY MEDICINE

## 2022-03-14 PROCEDURE — 90471 IMMUNIZATION ADMIN: CPT | Mod: S$GLB,,, | Performed by: FAMILY MEDICINE

## 2022-03-14 PROCEDURE — 99396 PREV VISIT EST AGE 40-64: CPT | Mod: 25,S$GLB,, | Performed by: FAMILY MEDICINE

## 2022-03-14 PROCEDURE — 90750 HZV VACC RECOMBINANT IM: CPT | Mod: S$GLB,,, | Performed by: FAMILY MEDICINE

## 2022-03-14 PROCEDURE — 90471 ZOSTER RECOMBINANT VACCINE: ICD-10-PCS | Mod: S$GLB,,, | Performed by: FAMILY MEDICINE

## 2022-03-14 PROCEDURE — 99396 PR PREVENTIVE VISIT,EST,40-64: ICD-10-PCS | Mod: 25,S$GLB,, | Performed by: FAMILY MEDICINE

## 2022-03-14 RX ORDER — SODIUM, POTASSIUM,MAG SULFATES 17.5-3.13G
SOLUTION, RECONSTITUTED, ORAL ORAL
Qty: 354 ML | Refills: 0 | Status: ON HOLD | OUTPATIENT
Start: 2022-03-14 | End: 2022-04-05 | Stop reason: HOSPADM

## 2022-03-14 RX ORDER — PANTOPRAZOLE SODIUM 40 MG/1
40 TABLET, DELAYED RELEASE ORAL DAILY
Qty: 90 TABLET | Refills: 3 | Status: SHIPPED | OUTPATIENT
Start: 2022-03-14 | End: 2023-03-27

## 2022-03-14 NOTE — PROGRESS NOTES
Subjective:      Patient ID: Young Haynes Jr. is a 51 y.o. male.    Chief Complaint: Medication Refill    Problem List Items Addressed This Visit     GERD (gastroesophageal reflux disease)    Relevant Medications    pantoprazole (PROTONIX) 40 MG tablet      Other Visit Diagnoses     Abnormal CT scan, gallbladder    -  Primary    Relevant Orders    [facility] ([facility]) [facility] and [facility]    Ambulatory referral/consult to General Surgery    Immunization due        Relevant Orders    Zoster Recombinant Vaccine    Zoster Recombinant Vaccine    Annual physical exam        Relevant Orders    HIV 1/2 Ag/Ab (4th Gen)    Colon cancer screening        Relevant Medications    sodium,potassium,mag sulfates (SUPREP BOWEL PREP KIT) 17.5-3.13-1.6 gram SolR    Other Relevant Orders    Case Request Endoscopy: COLONOSCOPY (Completed)    COVID-19 Routine Screening    Encounter for prostate cancer screening        Relevant Orders    PSA, Screening    Encounter for lipid screening for cardiovascular disease        Relevant Orders    Lipid Panel        He came in today to get a physical and get his med refilled however he had a complaint that is left over from when he went to the emergency room recently in January with chest pain after eating Boudin.  The patient had an extensive workup which resulted in him having what was found to be a thickened gallbladder wall on ultrasound an on his CT.  It appeared on the CT that he had gallstones.  He was advised to go to a surgeon but he has not done that yet.  The patient states that he had chest pain and abdominal pain and nausea when he had this pain in January and he has had 5 episodes sense.  He is noted that if he vomits, it feels a little better.  He currently has no pain at all.  We discussed the abnormalities found on his scans.  He did have normal liver enzymes at that time.    Below are his records from the hospital.Discharge Instructions  - documented in this encounter    Table  of Contents for Discharge Instructions   Instructions   Attachments      Instructions  Zane Crooks MD - 01/05/2021    Lots of fluids and very bland diet. Take Protonix daily as directed. Take pain and nausea medication as needed when spasm occurs. Watch for sedation and discontinue use if this occurs. Take Carafate as directed with meals. Follow-up with general surgery for further evaluation. Follow-up with your primary care provider for further evaluation. Return as needed if worse or not better.       Back to top of Discharge Instructions  Attachments  The following attachments cannot be sent through Care Everywhere.    Nonspecific Chest Pain Adult (English)  Biliary Colic Adult (English)  Hypertension Adult (English)  [place] Diet (English)  Esophagitis (English)  Gastroesophageal Reflux Disease Adult (English)  Back to top of Discharge Instructions    Medications at Time of Discharge  - documented as of this encounter    Medications at Time of Discharge  Medication Sig Dispensed Refills Start Date End Date   HYDROcodone-acetaminophen (NORCO) 5-325 mg Tab per tablet   Take 1 tablet by mouth every 6 (six) hours as needed for Pain for up to 10 days 12 tablet   0 01/05/2021 01/15/2021   meclizine 25 mg tablet   Take 1 tablet (25 mg total) by mouth 3 (three) times daily as needed. 15 tablet   0 04/05/2018     ondansetron (Zofran ODT) 4 MG TbDi disintegrating tablet    Indications: NON-CHEMOTHERAPY RELATED NAUSEA AND VOMITING Take 1 tablet (4 mg total) by mouth every 8 (eight) hours as needed for Nausea 20 tablet   0 01/05/2021     pantoprazole (PROTONIX) 20 MG TbEC tablet   Take 2 tablets (40 mg total) by mouth daily 60 tablet   0 01/05/2021 02/04/2021   sucralfate (CARAFATE) 100 mg/mL Susp suspension   Take 10 mLs (1 g total) by mouth 4 (four) times daily with meals and at bedtime for 10 days 400 mL   0 01/05/2021 01/15/2021     Ordered Prescriptions  - documented in this encounter    Ordered  "Prescriptions  Prescription Sig Dispensed Refills Start Date End Date   pantoprazole (PROTONIX) 20 MG TbEC tablet   Take 2 tablets (40 mg total) by mouth daily 60 tablet   0 01/05/2021 02/04/2021   sucralfate (CARAFATE) 100 mg/mL Susp suspension   Take 10 mLs (1 g total) by mouth 4 (four) times daily with meals and at bedtime for 10 days 400 mL   0 01/05/2021 01/15/2021   ondansetron (Zofran ODT) 4 MG TbDi disintegrating tablet    Indications: NON-CHEMOTHERAPY RELATED NAUSEA AND VOMITING Take 1 tablet (4 mg total) by mouth every 8 (eight) hours as needed for Nausea 20 tablet   0 01/05/2021     HYDROcodone-acetaminophen (NORCO) 5-325 mg Tab per tablet   Take 1 tablet by mouth every 6 (six) hours as needed for Pain for up to 10 days 12 tablet   0 01/05/2021 01/15/2021     Discharge Disposition  - documented in this encounter    Discharge Disposition  Disposition Code Departure Means Destination   Home or Self Care     Home     ED Notes  - documented in this encounter    Table of Contents for ED Notes   Zane Crooks MD - 01/05/2021 9:08 PM CST   Pam Mcgill RN - 01/05/2021 7:11 PM CST      Zane Crooks MD - 01/05/2021 9:08 PM CST  Formatting of this note might be different from the original.      Triage Note Reviewed    History     Chief Complaint   Patient presents with    Chest Pain     History of Present Illness50-year-old male chief complaint is chest pain. Patient started this afternoon about 1:00 with a right-sided severe sharp sudden onset chest pain that radiated through to his right back. Associate with nausea but no vomiting. No diarrhea. Pain was constant throughout the evening but started to subside shortly before arriving here in the emergency department. Patient states he was eating a piece of "hot Boudin" when the pain began. He does have a history of reflux disease but is not currently on regular medication. He had Protonix leftover from a previous prescription and took " 1 after the pain started but it did not give any significant relief. He took no other medications. Denies shortness of breath or diaphoresis. No cardiac history. No history of hypertension but blood pressure was noted to be slightly elevated at triage. Patient states he has had reflux symptoms in the past but this felt much worse than anything he had experienced. Patient denies doing any physical or other strenuous activity that may have stressed his body or heart.    Review of Systems   Constitutional: Negative for chills and fever.   HENT: Negative for congestion, postnasal drip, rhinorrhea, sinus pressure, sinus pain, sneezing and sore throat.   Eyes: Negative for pain and visual disturbance.   Respiratory: Positive for shortness of breath. Negative for cough and wheezing.   Cardiovascular: Positive for chest pain. Negative for palpitations and leg swelling.   Gastrointestinal: Positive for nausea. Negative for abdominal pain, constipation, diarrhea and vomiting.   Genitourinary: Negative for dysuria, frequency, hematuria and urgency.   Musculoskeletal: Negative for arthralgias and back pain.   Skin: Negative for color change, pallor, rash and wound.   Allergic/Immunologic: Negative for immunocompromised state.   Neurological: Negative for dizziness, weakness and light-headedness.   Hematological: Negative for adenopathy.   Psychiatric/Behavioral: Negative for agitation and behavioral problems.   All other systems reviewed and are negative.        No Known Allergies    Past Medical History:   Diagnosis Date    GERD (gastroesophageal reflux disease)     No past surgical history on file.     Family History   Problem Relation Age of Onset    Hypertension Mother    Hypertension Father    Diabetes Father     Social History     Tobacco Use    Smoking status: Former Smoker   Quit date:    Years since quittin.0    Smokeless tobacco: Never Used   Substance Use Topics    Alcohol use: No    Drug use: No  "    Smoking Cessation Program     E-Cigarette/Vaping     Physical Exam     Visit Vitals  BP (!) 128/93   Pulse 62   Temp 98 °F (36.7 °C) (Oral)   Resp 20   Ht 5' 2" (1.575 m)   Wt 173 lb (78.5 kg)   SpO2 98%   BMI 31.64 kg/m²     Physical Exam  Vitals signs and nursing note reviewed.   Constitutional:   General: He is not in acute distress.  Appearance: Normal appearance. He is well-developed and normal weight. He is not ill-appearing, toxic-appearing or diaphoretic.   HENT:   Head: Normocephalic and atraumatic.   Right Ear: Tympanic membrane, ear canal and external ear normal.   Left Ear: Tympanic membrane, ear canal and external ear normal.   Nose: Nose normal. No congestion or rhinorrhea.   Mouth/Throat:   Mouth: Mucous membranes are moist.   Pharynx: Oropharynx is clear. No oropharyngeal exudate or posterior oropharyngeal erythema.   Eyes:   General: No scleral icterus.   Right eye: No discharge.   Left eye: No discharge.   Extraocular Movements: Extraocular movements intact.   Conjunctiva/sclera: Conjunctivae normal.   Pupils: Pupils are equal, round, and reactive to light.   Neck:   Musculoskeletal: Normal range of motion and neck supple. No neck rigidity or muscular tenderness.   Cardiovascular:   Rate and Rhythm: Normal rate and regular rhythm.   Pulses: Normal pulses.   Heart sounds: Normal heart sounds. No murmur. No friction rub. No gallop.   Pulmonary:   Effort: Pulmonary effort is normal. No respiratory distress.   Breath sounds: Normal breath sounds. No stridor. No wheezing, rhonchi or rales.   Chest:   Chest wall: No tenderness.   Abdominal:   General: Abdomen is flat. Bowel sounds are normal. There is no distension.   Palpations: Abdomen is soft. There is no mass.   Tenderness: There is no abdominal tenderness. There is no guarding or rebound.   Hernia: No hernia is present.   Musculoskeletal: Normal range of motion.   General: No swelling, tenderness, deformity or signs of injury.   Right lower leg: " No edema.   Left lower leg: No edema.   Lymphadenopathy:   Cervical: No cervical adenopathy.   Skin:  General: Skin is warm and dry.   Capillary Refill: Capillary refill takes less than 2 seconds.   Coloration: Skin is not jaundiced or pale.   Findings: No bruising, erythema, lesion or rash.   Neurological:   General: No focal deficit present.   Mental Status: He is alert and oriented to person, place, and time.   Psychiatric:   Behavior: Behavior normal.   Comments: Appears somewhat anxious     ED Course     Labs Reviewed   CBC WITH DIFFERENTIAL - Abnormal; Notable for the following components:   Result Value   MPV 13.8 (*)   Neutrophils Percent 80.4 (*)   Lymphocytes Percent 12.6 (*)   Immature Granulocyte % 0.6 (*)   Neutrophils Absolute 7.1 (*)   Lymphocytes Absolute 1.1 (*)   # Immature Granulocyte 0.05 (*)   Immature Platelet Fraction % 15.0 (*)   All other components within normal limits   COMPREHENSIVE METABOLIC PANEL - Abnormal; Notable for the following components:   Glucose 117 (*)   Creatinine 0.87 (*)   All other components within normal limits   CKMBO   CPK   TROPONIN I   MAGNESIUM   PTT   PROTIME-INR   BNP   GLOMERULAR FILTRATION RATE   LIPASE   TROPONIN I     Lab Results for last 36Hrs:  Recent Results (from the past 36 hour(s))   CBC with Differential   Collection Time: 01/05/21 9:10 PM   Result Value Ref Range   WBC 8.8 4.4 - 11.2 10*3/uL   RBC 5.27 4.70 - 6.10 10*6/uL   HGB 14.9 14.0 - 18.0 g/dL   HCT 44.3 42.0 - 52.0 %   MCV 84.1 80.0 - 94.0 fL   MCH 28.3 27.0 - 31.0 pg   MCHC 33.6 33.0 - 37.0 g/dL   RDW 13.1 11.5 - 14.5 %   Platelet Count 164 130 - 375 10*3/uL   MPV 13.8 (H) 8.7 - 13.0 fL   Neutrophils Percent 80.4 (H) 36.0 - 66.0 %   Lymphocytes Percent 12.6 (L) 21.0 - 50.0 %   Monocytes Percent 5.3 2.0 - 10.0 %   Eosinophils Percent 1.0 0.0 - 10.0 %   Basophils Percent 0.3 0.0 - 1.0 %   Immature Granulocyte % 0.6 (H) 0.0 - 0.4 %   Neutrophils Absolute 7.1 (H) 1.4 - 6.5 10*3/uL   Lymphocytes  Absolute 1.1 (L) 1.2 - 3.4 10*3/uL   Monocytes Absolute 0.5 0.1 - 1.0 10*3/uL   Eosinophils Absolute 0.1 0.0 - 0.7 10*3/uL   Basophils Absolute 0.0 0.0 - 0.2 10*3/uL   # Immature Granulocyte 0.05 (H) 0.00 - 0.03 10*3/uL   Immature Platelet Fraction % 15.0 (H) 0.2 - 7.0 %   Comprehensive metabolic panel   Collection Time: 01/05/21 9:10 PM   Result Value Ref Range   Glucose 117 (H) 65 - 99 mg/dL   Sodium 137 136 - 144 mmol/L   Potassium 4.3 3.6 - 5.1 mmol/L   Chloride 104 101 - 111 mmol/L   CO2 24 22 - 32 mmol/L   BUN 16 8 - 20 mg/dL   Calcium 9.3 8.9 - 10.3 mg/dL   Creatinine 0.87 (L) 0.90 - 1.30 mg/dL   Albumin 4.3 3.5 - 4.8 g/dL   Total Bilirubin 0.8 0.4 - 2.0 mg/dL   ALKP 67 28 - 116 U/L   Total Protein 6.9 6.1 - 7.9 g/dL   ALT 45 5 - 56 U/L   AST 27 12 - 40 U/L   Anion Gap 9 7 - 16 mmol/L   CK-MB   Collection Time: 01/05/21 9:10 PM   Result Value Ref Range   CK-MB access 2.1 0.3 - 8.2 ng/mL   CK   Collection Time: 01/05/21 9:10 PM   Result Value Ref Range    5 - 289 U/L   Troponin I   Collection Time: 01/05/21 9:10 PM   Result Value Ref Range   Troponin I <0.03 0.00 - 0.04 ng/mL   Magnesium   Collection Time: 01/05/21 9:10 PM   Result Value Ref Range   MG 2.2 1.8 - 2.5 mg/dL   APTT   Collection Time: 01/05/21 9:10 PM   Result Value Ref Range   PTT 31.3 25.1 - 36.5 sec   Protime-INR Pt is NOT on Coumadin   Collection Time: 01/05/21 9:10 PM   Result Value Ref Range   Protime 10.8 9.4 - 12.5 sec   INR 1.00 0.80 - 1.23   BNP(NPA)   Collection Time: 01/05/21 9:10 PM   Result Value Ref Range   BNP <10.0 0.0 - 99.0   Glomerular Filtration Rate   Collection Time: 01/05/21 9:10 PM   Result Value Ref Range   GFR Non African American >60 >59 mL/min   GFR African American >60 >59 mL/min   Lipase   Collection Time: 01/05/21 9:10 PM   Result Value Ref Range   Lipase 28 8 - 57 U/L   Troponin I   Collection Time: 01/05/21 11:00 PM   Result Value Ref Range   Troponin I <0.03 0.00 - 0.04 ng/mL     Diagnostic Results for  last 36Hrs:  Ct Angiogram Pe Protocol W Contrast    Result Date: 1/5/2021  REASON FOR EXAM: PE suspected, high pretest prob TECHNICAL FACTORS: Multiple contiguous axial CT images were obtained of the chest after the administration of intravenous contrast. ASIR was utilized for radiation reduction. 2-D sagittal and coronal reformatted images were performed. 3-D MIP images were also obtained with postprocessing performed without the use of an independent workstation under concurrent radiologist supervision. COMPARISON: None FINDINGS: The thyroid gland and structures at the base the neck are unremarkable. The thoracic aorta is normal in course and contour. There is no aneurysm or dissection. There is no mediastinal or hilar lymphadenopathy. Heart size is within normal limits. There is no pericardial fluid or evidence of right heart strain. The pulmonary arteries distribute and opacify normally. There is no filling defect observed. Symmetrical pulmonary aeration. Mild mosaic attenuation in the lung bases which may indicate air trapping. No focal pneumonia, pleural effusion, or pneumothorax. Partially imaged gallbladder appears mildly distended. There are radiopaque gallstones noted within the lumen and there appears to be mild gallbladder wall thickening or regional stranding. Upper abdominal structures otherwise unremarkable. There are degenerative changes of the spine. There is an old left posterior eighth rib fracture.     1. No evidence of pulmonary thromboembolism. 2. Mild mosaic attenuation of the lung bases which may indicate air trapping or volume loss. 3. Gallstones within a distended gallbladder with associated gallbladder wall thickening. Findings are suspicious for acute cholecystitis. Electronically signed by Monster Shi MD on 1/5/2021 10:25 PM     Us Gallbladder    Result Date: 1/5/2021  REASON FOR EXAM: possible cholecystitis seen on CT TECHNICAL FACTORS: B-mode and Doppler sonographic images were  obtained of the gallbladder and common bile duct. TECHNOLOGIST: Zabrina Duran COMPARISON: None. Correlation with CT angiogram PE protocol January 5, 2021 FINDINGS: Borderline gallbladder distention with gallbladder wall thickening measuring up to 5 mm. No definitive echogenic shadowing gallstone. The common bile duct measures 4.8 mm in diameter. Briseno sign is not elicited on real-time exam. The pancreas is obscured by bowel gas. Limited views of the liver demonstrate no focal finding. IMPRESSION: Borderline gallbladder distention and gallbladder wall thickening. No definitive gallstone. Previously noted presumed gallstones on CT of the chest are not readily apparent. Electronically signed by Monster Shi MD on 1/5/2021 11:15 PM     Xr Chest Ap Portable    Result Date: 1/5/2021  REASON FOR EXAM: Chest Pain TECHNICAL FACTORS: 1 view(s) COMPARISON: April 4, 2018 FINDINGS: The lungs are clear. The cardiac silhouette and pulmonary vascularity are within normal limits. There is no evidence of pleural effusion or pneumothorax. Osseous structures are unremarkable.     No acute findings. Electronically signed by Monster Shi MD on 1/5/2021 9:43 PM     Wet Read Results   US Gallbladder   Final Result     CT Angiogram PE Protocol W Contrast   Final Result   1. No evidence of pulmonary thromboembolism.   2. Mild mosaic attenuation of the lung bases which may indicate air trapping or volume loss.   3. Gallstones within a distended gallbladder with associated gallbladder wall thickening. Findings are suspicious for acute cholecystitis.         Electronically signed by Monster Shi MD on 1/5/2021 10:25 PM       XR Chest AP Portable   Final Result     No acute findings.     Electronically signed by Monster Shi MD on 1/5/2021 9:43 PM         Medications   GI Cocktail suspension (45 mLs Oral $Given 1/5/21 2116)   iopamidoL (ISOVUE-370) 76 % solution 75 mL (75 mLs Intravenous $Given 1/5/21 4668)     Procedures    ED Course  as of Jan 06 1213   Tue Jan 05, 2021 2110 ED Physician Independent Review and Interpretation of the EKG: Sinus rhythm at a rate of 73. Normal axis. Normal intervals. Normal QRS. No acute ST or T wave changes.     2122 ED Physician Independent Review and Interpretation of Radiology Study: Chest x-ray shows no acute cardiopulmonary disease.     2208 ED Physician Independent Review and Interpretation of the EKG: Sinus rhythm at a rate of 73. Normal axis. Normal intervals. Normal QRS. No acute ST or T wave changes.       MDM  Number of Diagnoses or Management Options  Risk of Complications, Morbidity, and/or Mortality  General comments: EKG showed no acute changes. Cardiac enzyme markers are negative. Metabolic profile is negative. CT chest shows no evidence of pulmonary embolus but there is a question of gallbladder inflammation. Ultrasound does not show any evidence of cholecystitis but there is evidence of a mildly thickened wall. This is consistent with biliary colic due to gallbladder inflammation. Likely is the result of the food that the patient ingested earlier in the day. Patient can be safely discharged to home. Patient be placed on appropriate antiemetic pain and antiacid medication. I recommended dietary modifications and the patient will be referred to follow-up with surgery. I have discussed all diagnostic testing and results with the patient. Patient states they are comfortable with the diagnosis, instructions, prescriptions, and plan. Patient states they will follow up as directed. Patient has been advised to return to the emergency department as needed if worse or not better or for any other symptoms that may arise.    Patient Progress  Patient progress: stable      Medication List     START taking these medications   HYDROcodone-acetaminophen 5-325 mg Tab per tablet  Commonly known as: NORCO  Take 1 tablet by mouth every 6 (six) hours as needed for Pain for up to 10 days    ondansetron 4 MG Tbdi  disintegrating tablet  Commonly known as: Zofran ODT  Take 1 tablet (4 mg total) by mouth every 8 (eight) hours as needed for Nausea    sucralfate 100 mg/mL Susp suspension  Commonly known as: CARAFATE  Take 10 mLs (1 g total) by mouth 4 (four) times daily with meals and at bedtime for 10 days      CHANGE how you take these medications   pantoprazole 20 MG Tbec tablet  Commonly known as: PROTONIX  Take 2 tablets (40 mg total) by mouth daily  What changed:   · medication strength  · when to take this      ASK your doctor about these medications   meclizine 25 mg Tab tablet  Commonly known as: ANTIVERT  Take 1 tablet (25 mg total) by mouth 3 (three) times daily as needed.        Where to Get Your Medications     You can get these medications from any pharmacy   Bring a paper prescription for each of these medications  · HYDROcodone-acetaminophen 5-325 mg Tab per tablet  · ondansetron 4 MG Tbdi disintegrating tablet  · pantoprazole 20 MG Tbec tablet  · sucralfate 100 mg/mL Susp suspension      ED Critical Care Time    HEART SCORE   History: moderately suspicious  ECG: Normal  Age: 45 - 65 years  Risk Factor: 1-2 risk factors  Troponin: less than or equal to normal limit  Score: 3    Diagnosis:    Final diagnoses:   Acute chest pain   Biliary colic   Hypertension, unspecified type       Zane Crooks MD  01/06/21 1214      Electronically signed by Zane Crooks MD at 01/06/2021 12:14 PM CST    Back to top of ED Notes  Pam Mcgill RN - 01/05/2021 7:11 PM CST  Pt ambulatory to triage c/o sharp R sided chest pain thats radiates to back. Pain started after eating lunch today and has progressively gotten worse. +SOB. Reports emesis x 1 today.     Electronically signed by Pam Mcgill RN at 01/05/2021 7:15 PM CST    Back to top of ED Notes    Plan of Treatment  - documented as of this encounter    Not on file    Procedures  - documented in this encounter    Procedures  Procedure Name  Priority Date/Time Associated Diagnosis Comments   TROPONIN I Timed 01/05/2021 11:00 PM CST   Results for this procedure are in the results section.     US GALLBLADDER STAT 01/05/2021 10:57 PM CST   Results for this procedure are in the results section.     ECG 12-LEAD STAT 01/05/2021 10:08 PM CST       CT ANGIOGRAM PE PROTOCOL WITH CONTRAST STAT 01/05/2021 9:59 PM CST   Results for this procedure are in the results section.     XR CHEST AP PORTABLE ASAP 01/05/2021 9:19 PM CST   Results for this procedure are in the results section.     GLOMERULAR FILTRATION RATE ASAP 01/05/2021 9:10 PM CST   Results for this procedure are in the results section.     CKMB ASAP 01/05/2021 9:10 PM CST   Results for this procedure are in the results section.     TROPONIN I ASAP 01/05/2021 9:10 PM CST   Results for this procedure are in the results section.     PTT ASAP 01/05/2021 9:10 PM CST   Results for this procedure are in the results section.     PROTIME-INR ASAP 01/05/2021 9:10 PM CST   Results for this procedure are in the results section.     CBC WITH DIFFERENTIAL ASAP 01/05/2021 9:10 PM CST   Results for this procedure are in the results section.     BNP(NPA) ASAP 01/05/2021 9:10 PM CST   Results for this procedure are in the results section.     MAGNESIUM ASAP 01/05/2021 9:10 PM CST   Results for this procedure are in the results section.     LIPASE ASAP 01/05/2021 9:10 PM CST   Results for this procedure are in the results section.     CPK ASAP 01/05/2021 9:10 PM CST   Results for this procedure are in the results section.     COMPREHENSIVE METABOLIC PANEL ASAP 01/05/2021 9:10 PM CST   Results for this procedure are in the results section.     ECG 12-LEAD ASAP 01/05/2021 7:14 PM CST         Lab Results  - documented in this encounter    Table of Contents for Lab Results   Troponin I (01/05/2021 11:00 PM CST)   Lipase (01/05/2021 9:10 PM CST)   Glomerular Filtration Rate (01/05/2021 9:10 PM CST)   Troponin I (01/05/2021 9:10  PM CST)   CK (01/05/2021 9:10 PM CST)   CK-MB (01/05/2021 9:10 PM CST)   BNP(NPA) (01/05/2021 9:10 PM CST)   Protime-INR Pt is NOT on Coumadin (01/05/2021 9:10 PM CST)   APTT (01/05/2021 9:10 PM CST)   Magnesium (01/05/2021 9:10 PM CST)   Comprehensive metabolic panel (01/05/2021 9:10 PM CST)   CBC with Differential (01/05/2021 9:10 PM CST)        Troponin I (01/05/2021 11:00 PM CST)  Lab Results - Troponin I (01/05/2021 11:00 PM CST)  Component Value Ref Range Performed At [facility]   Troponin I <0.03  Comment:   Troponin I values of >0.49 ng/mL are recommended for diagnosis  of Acute Myocardial Infarction, as this yields optimal  performance of sensitivity and specificity.  After a myocardial infarction, cardiac Troponin I increases  quickly and peaks (12-10 hours), then returns to normal in  4-9 days.  Any condition resulting in myocardial cell damage  can potentially elevate cardiac Troponin I levels - i.e.  Unstable Angina, CHF, Myocarditis, Cardiac Surgery, or  invasive testing.  Interpret Troponin I results in the light  of the total clinical history, Troponin I results from serial  samples, additional lab tests, ECG's, etc.   0.00 - 0.04 ng/mL [facility]       Lab Results - Troponin I (01/05/2021 11:00 PM CST)  Specimen   Blood specimen (specimen) - Blood specimen (specimen)     Lab Results - Troponin I (01/05/2021 11:00 PM CST)  Performing Organization Address City/[facility]/ZIP Code Phone Number   [facility]   [id number] [address]   [place] [address]          Back to top of Lab Results       Lipase (01/05/2021 9:10 PM CST)  Lab Results - Lipase (01/05/2021 9:10 PM CST)  Component Value Ref Range Performed At [facility]   Lipase 28 8 - 57 U/L [facility]       Lab Results - Lipase (01/05/2021 9:10 PM CST)  Specimen   Blood specimen (specimen) - Blood specimen (specimen)     Lab Results - Lipase (01/05/2021 9:10 PM CST)  Performing Organization Address City/[facility]/ZIP Code Phone Number    [facility]   [id number] [place] [address]          Back to top of Lab Results       Glomerular Filtration Rate (01/05/2021 9:10 PM CST)  Lab Results - Glomerular Filtration Rate (01/05/2021 9:10 PM CST)  Component Value Ref Range Performed At Pathologist [facility]   GFR Non African American >60 >59 mL/min [facility]     GFR  >60  Comment:               STAGES OF CHRONIC KIDNEY DISEASE  STAGE          DESCRIPTION           GFR(mL/min/1.73 m2)  3         Moderate decrease GFR            30-59  4           Severe decrease GFR            15-29  5              Kidney Failure         <15 (or dialysis)  Chronic kidney disease is defined as either kidney damage  or GFR <60mL/min/1.73 m2 for >=3 months. Kidney damage is  defined as pathologic abnormalities or markers of damage,  including abnormalities in blood or urine tests or imaging  studies.   >59 mL/min [facility]       Lab Results - Glomerular Filtration Rate (01/05/2021 9:10 PM CST)  Specimen   Blood specimen (specimen)     Lab Results - Glomerular Filtration Rate (01/05/2021 9:10 PM CST)  Performing Organization Address City/State/ZIP Code Phone Number   [place] [facility]   [id number] [place] [address]          Back to top of Lab Results       Troponin I (01/05/2021 9:10 PM CST)  Lab Results - Troponin I (01/05/2021 9:10 PM CST)  Component Value Ref Range Performed At [facility]   Troponin I <0.03  Comment:   Troponin I values of >0.49 ng/mL are recommended for diagnosis  of Acute Myocardial Infarction, as this yields optimal  performance of sensitivity and specificity.  After a myocardial infarction, cardiac Troponin I increases  quickly and peaks (12-10 hours), then returns to normal in  4-9 days.  Any condition resulting in myocardial cell damage  can potentially elevate cardiac Troponin I levels - i.e.  Unstable Angina, CHF, Myocarditis, Cardiac Surgery, or  invasive testing.  Interpret Troponin I results in  the light  of the total clinical history, Troponin I results from serial  samples, additional lab tests, ECG's, etc.   0.00 - 0.04 ng/mL [facility]       Lab Results - Troponin I (01/05/2021 9:10 PM CST)  Specimen   Blood specimen (specimen) - Blood specimen (specimen)     Lab Results - Troponin I (01/05/2021 9:10 PM CST)  Performing Organization Address City/[facility] Code Phone Number   [facility]   [id number] [place] [address]          Back to top of Lab Results       CK (01/05/2021 9:10 PM CST)  Lab Results - CK (01/05/2021 9:10 PM CST)  Component Value Ref Range Performed At [facility]ologist [facility]   CPK 104Comment: SLIGHT HEMOLYSIS 5 - 289 U/L [facility]       Lab Results - CK (01/05/2021 9:10 PM CST)  Specimen   Blood specimen (specimen) - Blood specimen (specimen)     Lab Results - CK (01/05/2021 9:10 PM CST)  Performing Organization Address City/State/ZIP Code Phone Number   [facility]   [id number] [place] [place] [address]          Back to top of Lab Results       CK-MB (01/05/2021 9:10 PM CST)  Lab Results - CK-MB (01/05/2021 9:10 PM CST)  Component Value Ref Range Performed At [facility]   CK-MB access 2.1  Comment:   A single value should be interpreted cautiously.  Diagnosis of  MI depends on clinical findings, evolutionary changes in ECG  and a pattern of rising and falling cardiac markers (CK-MB,  Troponin, etc.).   0.3 - 8.2 ng/mL [facility]       Lab Results - CK-MB (01/05/2021 9:10 PM CST)  Specimen   Blood specimen (specimen) - Blood specimen (specimen)     Lab Results - CK-MB (01/05/2021 9:10 PM CST)  Performing Organization Address City/[facility]/ZIP Code Phone Number   [facility]   [id number] [place] [place] [address]          Back to top of Lab Results       BNP(NPA) (01/05/2021 9:10 PM CST)  Lab Results - BNP(NPA) (01/05/2021 9:10 PM CST)  Component Value Ref Range Performed At [facility]   BNP <10.0  Comment:   NATRIURETIC PEPTIDE  ASSAY:  Values of less than 100 pg/ml are consistent with non-CHF  populations.   0.0 - 99.0 [facility]       Lab Results - BNP(NPA) (01/05/2021 9:10 PM CST)  Specimen   Blood specimen (specimen) - Blood specimen (specimen)     Lab Results - BNP(NPA) (01/05/2021 9:10 PM CST)  Performing Organization Address City/[facility]/ZIP Code Phone Number   [facility]   [id number] [place] [address]          Back to top of Lab Results       Protime-INR Pt is NOT on Coumadin (01/05/2021 9:10 PM CST)  Lab Results - Protime-INR Pt is NOT on Coumadin (01/05/2021 9:10 PM CST)  Component Value Ref Range Performed At [facility]   Protime 10.8 9.4 - 12.5 sec [facility]     INR 1.00 0.80 - 1.23 [facility]       Lab Results - Protime-INR Pt is NOT on Coumadin (01/05/2021 9:10 PM CST)  Specimen   Blood - Blood specimen (specimen)     Lab Results - Protime-INR Pt is NOT on Coumadin (01/05/2021 9:10 PM CST)  Performing Organization Address City/[facility]/ZIP Code Phone Number   [facility]   [id number] [place] [address]          Back to top of Lab Results       APTT (01/05/2021 9:10 PM CST)  Lab Results - APTT (01/05/2021 9:10 PM CST)  Component Value Ref Range Performed At [facility]   PTT 31.3  Comment:   The PTT has not been validated for use in monitoring  unfractionated heparin therapy.  Due to the high sensitivity of our testing reagents, transient inhibitors of  coagulation (lupus-like inhibitors) may falsely prolong PTT in some  instances, especially in pediatric patients. Transient inhibitors are  typically induced by acute infections and/or antibiotics and do not usually  result in clinically significant bleeding. Additional testing is available  to differentiate between transient inhibitors of coagulation, a factor  deficiency, or a clinically significant factor inhibitor causing a prolonged  PTT.   25.1 - 36.5 sec [facility]       Lab Results - APTT (01/05/2021 9:10 PM CST)  Specimen    Blood - Blood specimen (specimen)     Lab Results - APTT (01/05/2021 9:10 PM CST)  Performing Organization Address City/[facility]/ZIP Code Phone Number   [facility]   [id number] [place] [address]          Back to top of Lab Results       Magnesium (01/05/2021 9:10 PM CST)  Lab Results - Magnesium (01/05/2021 9:10 PM CST)  Component Value Ref Range Performed At [facility]   MG 2.2Comment: SLIGHT HEMOLYSIS 1.8 - 2.5 mg/dL [facility]       Lab Results - Magnesium (01/05/2021 9:10 PM CST)  Specimen   Blood specimen (specimen) - Blood specimen (specimen)     Lab Results - Magnesium (01/05/2021 9:10 PM CST)  Performing Organization Address City/[facility] Code Phone Number   [facility]   [id number] [place] [address]          Back to top of Lab Results       Comprehensive metabolic panel (01/05/2021 9:10 PM CST)  Lab Results - Comprehensive metabolic panel (01/05/2021 9:10 PM CST)  Component Value Ref Range Performed At [facility]   Glucose 117 (H) 65 - 99 mg/dL [facility]     Sodium 137 136 - 144 mmol/L [facility]     Potassium 4.3 3.6 - 5.1 mmol/L [facility]     Chloride 104 101 - 111 mmol/L [facility]     CO2 24 22 - 32 mmol/L [facility]     BUN 16 8 - 20 mg/dL [facility]     Calcium 9.3 8.9 - 10.3 mg/dL [facility]     Creatinine 0.87 (L) 0.90 - 1.30 mg/dL [facility]     Albumin 4.3 3.5 - 4.8 g/dL [facility]     Total Bilirubin 0.8Comment: SLIGHT HEMOLYSIS 0.4 - 2.0 mg/dL [facility]     ALKP 67Comment: SLIGHT HEMOLYSIS 28 - 116 U/L [facility]     Total Protein 6.9 6.1 - 7.9 g/dL [facility]     ALT 45Comment: SLIGHT HEMOLYSIS 5 - 56 U/L [facility]     AST 27Comment: SLIGHT HEMOLYSIS 12 - 40 U/L [facility]     Anion Gap 9 7 - 16 mmol/L [facility]       Lab Results - Comprehensive metabolic panel (01/05/2021 9:10 PM CST)  Specimen   Blood specimen (specimen) - Blood specimen (specimen)     Lab Results - Comprehensive metabolic panel (01/05/2021 9:10 PM CST)  Performing  Organization Address City/State/ZIP Code Phone Number   [facility]   [id number] [place] [address]   [place] [address]          Back to top of Lab Results       CBC with Differential (01/05/2021 9:10 PM CST)  Lab Results - CBC with Differential (01/05/2021 9:10 PM CST)  Component Value Ref Range Performed At [facility]   WBC 8.8 4.4 - 11.2 10*3/uL [facility]     RBC 5.27 4.70 - 6.10 10*6/uL [facility]     HGB 14.9 14.0 - 18.0 g/dL [facility]     HCT 44.3 42.0 - 52.0 % [facility]     MCV 84.1 80.0 - 94.0 fL [facility]     MCH 28.3 27.0 - 31.0 pg [facility]     MCHC 33.6 33.0 - 37.0 g/dL [facility]     RDW 13.1 11.5 - 14.5 % [facility]     Platelet Count 164 130 - 375 10*3/uL [facility]     MPV 13.8 (H) 8.7 - 13.0 fL [facility]     Neutrophils Percent 80.4 (H) 36.0 - 66.0 % [facility]     Lymphocytes Percent 12.6 (L) 21.0 - 50.0 % [facility]     Monocytes Percent 5.3 2.0 - 10.0 % [facility]     Eosinophils Percent 1.0 0.0 - 10.0 % [facility]     Basophils Percent 0.3 0.0 - 1.0 % [facility]     Immature Granulocyte % 0.6 (H) 0.0 - 0.4 % [facility]     Neutrophils Absolute 7.1 (H) 1.4 - 6.5 10*3/uL [facility]     Lymphocytes Absolute 1.1 (L) 1.2 - 3.4 10*3/uL [facility]     Monocytes Absolute 0.5 0.1 - 1.0 10*3/uL [facility]     Eosinophils Absolute 0.1 0.0 - 0.7 10*3/uL [facility]     Basophils Absolute 0.0 0.0 - 0.2 10*3/uL [facility]     # Immature Granulocyte 0.05 (H) 0.00 - 0.03 10*3/uL [facility]     Immature Platelet Fraction % 15.0 (H) 0.2 - 7.0 % [facility]       Lab Results - CBC with Differential (01/05/2021 9:10 PM CST)  Specimen   Blood specimen (specimen)     Lab Results - CBC with Differential (01/05/2021 9:10 PM CST)  Performing Organization Address City/State/ZIP Code Phone Number   [facility]   [id number] [place] [address]          Back to top of Lab Results    Imaging Results  - documented in this encounter    Table of Contents for Imaging Results   US Gallbladder (01/05/2021  10:57 PM CST)   CT Angiogram PE Protocol W Contrast (01/05/2021 9:59 PM CST)   XR Chest AP Portable (01/05/2021 9:19 PM CST)        US Gallbladder (01/05/2021 10:57 PM CST)  Imaging Results - US Gallbladder (01/05/2021 10:57 PM CST)  Specimen         Imaging Results - US Gallbladder (01/05/2021 10:57 PM CST)  Narrative Performed At   REASON FOR EXAM: possible cholecystitis seen on CT         TECHNICAL FACTORS: B-mode and Doppler sonographic images were obtained of the gallbladder and common bile duct.         TECHNOLOGIST: Zabrina Duran        COMPARISON: None. Correlation with CT angiogram PE protocol January 5, 2021         FINDINGS: Borderline gallbladder distention with gallbladder wall thickening measuring up to 5 mm. No definitive echogenic shadowing gallstone.  The common bile duct measures 4.8 mm in diameter. Briseno sign is not elicited on real-time exam.  The     pancreas is obscured by bowel gas. Limited views of the liver demonstrate no focal finding.         IMPRESSION:    Borderline gallbladder distention and gallbladder wall thickening. No definitive gallstone.  Previously noted presumed gallstones on CT of the chest are not readily apparent.                 Electronically signed by Monster Shi MD on 1/5/2021 11:15 PM       Located within [facility] RADIOLOGY       Imaging Results - US Gallbladder (01/05/2021 10:57 PM CST)  Procedure Note   Interface, Rad Results In - 01/05/2021 11:18 PM CST    REASON FOR EXAM: possible cholecystitis seen on CT     TECHNICAL FACTORS: B-mode and Doppler sonographic images were obtained of the gallbladder and common bile duct.     TECHNOLOGIST: Zabrina Duran    COMPARISON: None. Correlation with CT angiogram PE protocol January 5, 2021     FINDINGS: Borderline gallbladder distention with gallbladder wall thickening measuring up to 5 mm. No definitive echogenic shadowing gallstone. The common bile duct measures 4.8 mm in diameter. Briseno sign is not elicited on real-time exam.  The pancreas is obscured by bowel gas. Limited views of the liver demonstrate no focal finding.     IMPRESSION:  Borderline gallbladder distention and gallbladder wall thickening. No definitive gallstone. Previously noted presumed gallstones on CT of the chest are not readily apparent.         Electronically signed by Monster Shi MD on 1/5/2021 11:15 PM         Imaging Results - US Gallbladder (01/05/2021 10:57 PM CST)  Performing Organization Address City/State/ZIP Code Phone Number   [facility]              Back to top of Imaging Results       CT Angiogram PE Protocol W Contrast (01/05/2021 9:59 PM CST)  Imaging Results - CT Angiogram PE Protocol W Contrast (01/05/2021 9:59 PM CST)  Specimen         Imaging Results - CT Angiogram PE Protocol W Contrast (01/05/2021 9:59 PM CST)  Impressions Performed At   1.  No evidence of pulmonary thromboembolism.    2.  Mild mosaic attenuation of the lung bases which may indicate air trapping or volume loss.    3. Gallstones within a distended gallbladder with associated gallbladder wall thickening. Findings are suspicious for acute cholecystitis.                 Electronically signed by Monster Shi MD on 1/5/2021 10:25 PM       [facility]       Imaging Results - CT Angiogram PE Protocol W Contrast (01/05/2021 9:59 PM CST)  Narrative Performed At   REASON FOR EXAM: PE suspected, high pretest prob         TECHNICAL FACTORS: Multiple contiguous axial CT images were obtained of the chest after the administration of intravenous contrast. ASIR was utilized for radiation reduction. 2-D sagittal and coronal reformatted images were performed. 3-D MIP images were     also obtained with postprocessing performed without the use of an independent workstation under concurrent radiologist supervision.        COMPARISON: None        FINDINGS: The thyroid gland and structures at the base the neck are unremarkable.  The thoracic aorta is normal in course and contour.  There is no aneurysm or dissection.  There is no mediastinal or hilar lymphadenopathy.  Heart size is within normal     limits. There is no pericardial fluid or evidence of right heart strain.          The pulmonary arteries distribute and opacify normally. There is no filling defect observed.  Symmetrical pulmonary aeration. Mild mosaic attenuation in the lung bases which may indicate air trapping. No focal pneumonia, pleural effusion, or     pneumothorax.          Partially imaged gallbladder appears mildly distended. There are radiopaque gallstones noted within the lumen and there appears to be mild gallbladder wall thickening or regional stranding. Upper abdominal structures otherwise unremarkable. There are     degenerative changes of the spine. There is an old left posterior eighth rib fracture.        [facility] RADIOLOGY       Imaging Results - CT Angiogram PE Protocol W Contrast (01/05/2021 9:59 PM CST)  Procedure Note   Interface, Rad Results In - 01/05/2021 10:32 PM CST    REASON FOR EXAM: PE suspected, high pretest prob     TECHNICAL FACTORS: Multiple contiguous axial CT images were obtained of the chest after the administration of intravenous contrast. ASIR was utilized for radiation reduction. 2-D sagittal and coronal reformatted images were performed. 3-D MIP images were also obtained with postprocessing performed without the use of an independent workstation under concurrent radiologist supervision.    COMPARISON: None    FINDINGS: The thyroid gland and structures at the base the neck are unremarkable. The thoracic aorta is normal in course and contour. There is no aneurysm or dissection. There is no mediastinal or hilar lymphadenopathy. Heart size is within normal limits. There is no pericardial fluid or evidence of right heart strain.     The pulmonary arteries distribute and opacify normally. There is no filling defect observed. Symmetrical pulmonary aeration. Mild mosaic attenuation in the lung  bases which may indicate air trapping. No focal pneumonia, pleural effusion, or pneumothorax.     Partially imaged gallbladder appears mildly distended. There are radiopaque gallstones noted within the lumen and there appears to be mild gallbladder wall thickening or regional stranding. Upper abdominal structures otherwise unremarkable. There are degenerative changes of the spine. There is an old left posterior eighth rib fracture.     IMPRESSION:   1. No evidence of pulmonary thromboembolism.  2. Mild mosaic attenuation of the lung bases which may indicate air trapping or volume loss.  3. Gallstones within a distended gallbladder with associated gallbladder wall thickening. Findings are suspicious for acute cholecystitis.         Electronically signed by Monster Shi MD on 1/5/2021 10:25 PM         Imaging Results - CT Angiogram PE Protocol W Contrast (01/05/2021 9:59 PM CST)  Performing Organization Address City/State/ZIP Code Phone Number   [facility] RADIOLOGY              Back to top of Imaging Results       XR Chest AP Portable (01/05/2021 9:19 PM CST)  Imaging Results - XR Chest AP Portable (01/05/2021 9:19 PM CST)  Specimen         Imaging Results - XR Chest AP Portable (01/05/2021 9:19 PM CST)  Impressions Performed At       No acute findings.        Electronically signed by Monster Shi MD on 1/5/2021 9:43 PM       [facility] RADIOLOGY       Imaging Results - XR Chest AP Portable (01/05/2021 9:19 PM CST)  Narrative Performed At   REASON FOR EXAM: Chest Pain         TECHNICAL FACTORS:  1 view(s)        COMPARISON: April 4, 2018         FINDINGS: The lungs are clear.  The cardiac silhouette and pulmonary vascularity are within normal limits.  There is no evidence of pleural effusion or pneumothorax.  Osseous structures are unremarkable.         [facility] RADIOLOGY       Imaging Results - XR Chest AP Portable (01/05/2021 9:19 PM CST)  Procedure Note   Interface, Rad Results In - 01/05/2021 9:46 PM CST     REASON FOR EXAM: Chest Pain     TECHNICAL FACTORS: 1 view(s)    COMPARISON: April 4, 2018     FINDINGS: The lungs are clear. The cardiac silhouette and pulmonary vascularity are within normal limits. There is no evidence of pleural effusion or pneumothorax. Osseous structures are unremarkable.     IMPRESSION:   No acute findings.    Electronically signed by Monster Shi MD on 1/5/2021 9:43 PM         Imaging Results - XR Chest AP Portable (01/05/2021 9:19 PM CST)  Performing Organization Address City/[facility]/ZIP Code Phone Number   [facility]              Back to top of Imaging Results    ECG Results  - documented in this encounter    Table of Contents for ECG Results   ECG 12 lead (01/05/2021 10:08 PM CST)   ECG 12 lead (01/05/2021 7:14 PM CST)        ECG 12 lead (01/05/2021 10:08 PM CST)  ECG Results - ECG 12 lead (01/05/2021 10:08 PM CST)  Specimen         ECG Results - ECG 12 lead (01/05/2021 10:08 PM CST)  Narrative Performed At   This result has an attachment that is not available.              ECG Results - ECG 12 lead (01/05/2021 10:08 PM CST)  Performing Organization Address City/[facility]/ZIP Code Phone Number   [facility]            Back to top of ECG Results       ECG 12 lead (01/05/2021 7:14 PM CST)  ECG Results - ECG 12 lead (01/05/2021 7:14 PM CST)  Specimen         ECG Results - ECG 12 lead (01/05/2021 7:14 PM CST)  Narrative Performed At   This result has an attachment that is not available.              ECG Results - ECG 12 lead (01/05/2021 7:14 PM CST)  Performing Organization Address City/State/ZIP Code Phone Number   [facility]            Back to top of ECG Results    Visit Diagnoses  - documented in this encounter    Visit Diagnoses  Diagnosis   Acute chest pain - Primary    Unspecified chest pain     Biliary colic    Calculus of gallbladder without mention of cholecystitis or obstruction     Hypertension, unspecified type     Calculus of bile duct without cholangitis or cholecystitis without  obstruction     Chest pain, unspecified     Nausea    Nausea alone     Essential (primary) hypertension    Unspecified essential hypertension       Administered Medications  - documented in this encounter    Administered Medications - Inactive Administered Medications - up to 3 most recent administrations  Inactive Administered Medications - up to 3 most recent administrations  Medication Order MAR Action Action Date Dose Rate Site   GI Cocktail suspension    45 mL, Oral, Once, 1 dose, Tue 1/5/21 at 2115, [place],    $Given 01/05/2021 9:16 PM CST 45 mLs             iopamidoL (ISOVUE-370) 76 % solution 75 mL    75 mL, Intravenous, IMG once as needed, contrast, radiology imaging, 1 dose, Starting Tue 1/5/21 at 2148, Until Tue 1/5/21 at 2159, *Not for intrathecal use.*, CAT SCAN   $Given 01/05/2021 9:59 PM CST 75 mLs             Inactive Administered Medications - up to 3 most recent administrations  Medication Order MAR Action Action Date Dose Rate Site   GI Cocktail suspension    45 mL, Oral, Once, 1 dose, Tue 1/5/21 at 2115, [place],    $Given 01/05/2021 9:16 PM CST 45 mLs                iopamidoL (ISOVUE-370) 76 % solution 75 mL    75 mL, Intravenous, IMG once as needed, contrast, radiology imaging, 1 dose, Starting Tue 1/5/21 at 2148, Until Tue 1/5/21 at 2159, *Not for intrathecal use.*, CAT SCAN   $Given 01/05/2021 9:59 PM CST 75 mLs                  Discontinued Medications  - documented as of this encounter    Discontinued Medications  Medication Sig Discontinue Reason Start Date End Date   pantoprazole (PROTONIX) 40 MG tablet   Take 40 mg by mouth.   03/16/2017 01/05/2021     Active and Recently Administered Medications  - documented in this encounter    Active and Recently Administered Medications - Legend  Legend  $Given Not Given Canceled Entry Hold Due Other Actions     Time (Time) Time Time Time Time-Action This medication is part of a linked order group. Click to view details.Times are shown in  CST.     Active and Recently Administered Medications - Scheduled  Scheduled  Medication Order 01/03/2021 01/04/2021 01/05/2021   GI Cocktail suspension (COMPLETED)  45 mL, Oral, Once, 1 dose, Tue 1/5/21 at 2115, ELIZABETH BINGHAM,        2116$Given^01/05 2116^Provider: Ann-Marie Soares RN^Dose: 45 mL^Route: Oral       Active and Recently Administered Medications - PRN  PRN  Medication Order 01/03/2021 01/04/2021 01/05/2021   iopamidoL (ISOVUE-370) 76 % solution 75 mL (COMPLETED)  75 mL, Intravenous, IMG once as needed, contrast, radiology imaging, 1 dose, Starting Tue 1/5/21 at 2148, Until Tue 1/5/21 at 2159, *Not for intrathecal use.*, CAT SCAN       2159$Given^01/05 2159^Provider: RT Yecenia^Dose: 75 mL^Route: Intravenous       Additional Health Concerns  - documented as of this encounter    Additional Health Concerns  Assessment Noted Time   PHQ-9 Depression Total Score: 0 01/05/2021 7:15 PM CST     Images  Patient Demographics    Patient Demographics  Patient Address Communication Language Race / Ethnicity Marital Status   [address]   [place] [id number] [phone] (Mobile)  [phone] (Home) English - Written (Preferred) White / Not  or       Patient Contacts    Patient Contacts  Contact Name Contact Address Communication Relationship to Patient   Stephy Haynes Unknown [phone] (Home) Mother, Emergency Contact     Document Information    Primary Care Provider Other Service Providers Document Coverage Dates   Oswaldo Childress MD (Jan. 05, 2021January 05, 2021 - Present)  [phone] (Work)  [phone] (Fax)  [address] [facility]  [place] [address]  Family Medicine     Jan. 05, 2021January 05, 2021      Organization   [facility]  [address] [place] [address] M.D. [id number]     Encounter Providers Encounter Date   Zane Crooks MD (Attending)  [phone] (Work)  [phone] (Fax)  [id number] [place] [address]  Emergency Medicine Jan.  05, 2021January 05, 2021      Show All Sections       The patient's Health Maintenance was reviewed and the following appears to be due:   Health Maintenance Due   Topic Date Due    HIV Screening  Never done    Colorectal Cancer Screening  Never done    Shingles Vaccine (1 of 2) Never done    Influenza Vaccine (1) Never done    COVID-19 Vaccine (3 - Booster for Moderna series) 12/24/2021       Past Medical History:  Past Medical History:   Diagnosis Date    GERD (gastroesophageal reflux disease)      No past surgical history on file.  Review of patient's allergies indicates:  No Known Allergies  Current Outpatient Medications on File Prior to Visit   Medication Sig Dispense Refill    [DISCONTINUED] pantoprazole (PROTONIX) 40 MG tablet TAKE 1 TABLET BY MOUTH ONCE DAILY 90 tablet 0     No current facility-administered medications on file prior to visit.     Social History     Socioeconomic History    Marital status:    Occupational History     Employer: [employer]   Tobacco Use    Smoking status: Never Smoker    Smokeless tobacco: Never Used   Substance and Sexual Activity    Alcohol use: No    Drug use: No     Family History   Problem Relation Age of Onset    Hypertension Father     Diabetes Father     Cancer Maternal Grandfather     Diabetes Maternal Grandfather     Heart attack Paternal Grandfather     Stroke Paternal Grandfather     Diabetes Paternal Grandfather        Review of Systems   Constitutional: Negative.  Negative for chills, diaphoresis and fever.   HENT: Negative for congestion, hearing loss, mouth sores, postnasal drip and sore throat.    Eyes: Negative for pain and visual disturbance.   Respiratory: Negative for cough, chest tightness, shortness of breath and wheezing.    Cardiovascular: Negative for chest pain.   Gastrointestinal: Positive for abdominal pain (This is intermittent ) and nausea ( this is intermittent.). Negative for anal bleeding, blood in stool, constipation,  "diarrhea and vomiting.   Genitourinary: Negative for dysuria and hematuria.   Musculoskeletal: Negative for back pain, neck pain and neck stiffness.   Skin: Negative for rash.   Neurological: Negative for dizziness and weakness.       Objective:   /86   Pulse 77   Temp 98.3 °F (36.8 °C)   Ht 5' 2" (1.575 m)   Wt 78.5 kg (173 lb)   BMI 31.64 kg/m²     Physical Exam  Constitutional:       Appearance: He is well-developed. He is not diaphoretic.   HENT:      Head: Normocephalic and atraumatic.      Right Ear: External ear normal.      Left Ear: External ear normal.      Nose: Nose normal.      Mouth/Throat:      Pharynx: No oropharyngeal exudate.   Eyes:      General: No scleral icterus.        Right eye: No discharge.         Left eye: No discharge.      Conjunctiva/sclera: Conjunctivae normal.      Pupils: Pupils are equal, round, and reactive to light.   Neck:      Thyroid: No thyromegaly.      Vascular: No JVD.   Cardiovascular:      Rate and Rhythm: Normal rate and regular rhythm.      Heart sounds: Normal heart sounds. No murmur heard.    No friction rub. No gallop.   Pulmonary:      Effort: Pulmonary effort is normal. No respiratory distress.      Breath sounds: Normal breath sounds. No wheezing or rales.   Chest:      Chest wall: No tenderness.   Abdominal:      General: Bowel sounds are normal. There is no distension.      Palpations: Abdomen is soft. There is no mass.      Tenderness: There is no abdominal tenderness. There is no guarding or rebound.   Musculoskeletal:         General: No tenderness. Normal range of motion.      Cervical back: Normal range of motion and neck supple.   Lymphadenopathy:      Cervical: No cervical adenopathy.   Skin:     General: Skin is warm and dry.   Neurological:      Mental Status: He is alert and oriented to person, place, and time.      Cranial Nerves: No cranial nerve deficit.      Coordination: Coordination normal.       Assessment:     1. Annual physical " exam    2. Abnormal CT scan, gallbladder    3. Gastroesophageal reflux disease without esophagitis    4. Immunization due    5. Colon cancer screening    6. Encounter for prostate cancer screening    7. Encounter for lipid screening for cardiovascular disease      Plan:   I have changed Young ALAMOWarren Haynes Jr.'s pantoprazole. I am also having him start on SUPREP BOWEL PREP KIT.  Problem List Items Addressed This Visit     GERD (gastroesophageal reflux disease)    Relevant Medications    pantoprazole (PROTONIX) 40 MG tablet      Other Visit Diagnoses     Abnormal CT scan, gallbladder    -  Primary    Relevant Orders    NM Hepatobiliary (HIDA) W Pharm and Ejec    Ambulatory referral/consult to General Surgery    Immunization due        Relevant Orders    Zoster Recombinant Vaccine    Zoster Recombinant Vaccine    Annual physical exam        Relevant Orders    HIV 1/2 Ag/Ab (4th Gen)    Colon cancer screening        Relevant Medications    sodium,potassium,mag sulfates (SUPREP BOWEL PREP KIT) 17.5-3.13-1.6 gram SolR    Other Relevant Orders    Case Request Endoscopy: COLONOSCOPY (Completed)    COVID-19 Routine Screening    Encounter for prostate cancer screening        Relevant Orders    PSA, Screening    Encounter for lipid screening for cardiovascular disease        Relevant Orders    Lipid Panel        No follow-ups on file.    Young was seen today for medication refill.    Diagnoses and all orders for this visit:    Abnormal CT scan, gallbladder  -     NM Hepatobiliary (HIDA) W Pharm and Ejec; Future  -     Ambulatory referral/consult to General Surgery; Future    Gastroesophageal reflux disease without esophagitis  -     pantoprazole (PROTONIX) 40 MG tablet; Take 1 tablet (40 mg total) by mouth once daily.    Immunization due  -     Zoster Recombinant Vaccine; Future  -     Zoster Recombinant Vaccine; Future    Annual physical exam  -     HIV 1/2 Ag/Ab (4th Gen); Future    Colon cancer screening  -     Case  Request Endoscopy: COLONOSCOPY  -     COVID-19 Routine Screening; Future  -     sodium,potassium,mag sulfates (SUPREP BOWEL PREP KIT) 17.5-3.13-1.6 gram SolR; Take as instructed on prep sheet    Encounter for prostate cancer screening  -     PSA, Screening; Future    Encounter for lipid screening for cardiovascular disease  -     Lipid Panel; Future      Medications Ordered This Encounter   Medications    pantoprazole (PROTONIX) 40 MG tablet     Sig: Take 1 tablet (40 mg total) by mouth once daily.     Dispense:  90 tablet     Refill:  3    sodium,potassium,mag sulfates (SUPREP BOWEL PREP KIT) 17.5-3.13-1.6 gram SolR     Sig: Take as instructed on prep sheet     Dispense:  354 mL     Refill:  0     The patient was instructed to stop the following meds:  Medications Discontinued During This Encounter   Medication Reason    pantoprazole (PROTONIX) 40 MG tablet Reorder     Orders Placed This Encounter   Procedures    NM Hepatobiliary (HIDA) W Pharm and Ejec     Standing Status:   Future     Standing Expiration Date:   3/14/2023     Scheduling Instructions:      Schedule a HIDA scan with ejection fraction.     Order Specific Question:   May the Radiologist modify the order per protocol to meet the clinical needs of the patient?     Answer:   Yes    Zoster Recombinant Vaccine     Standing Status:   Future     Standing Expiration Date:   9/14/2022    Zoster Recombinant Vaccine     Standing Status:   Future     Standing Expiration Date:   4/14/2022    Lipid Panel     Standing Status:   Future     Standing Expiration Date:   3/14/2023    PSA, Screening     Standing Status:   Future     Standing Expiration Date:   3/15/2023    COVID-19 Routine Screening     Standing Status:   Future     Standing Expiration Date:   5/13/2023     Order Specific Question:   Is the patient symptomatic?     Answer:   No     Order Specific Question:   Is this needed for pre-procedure or pre-op testing?     Answer:   Yes     Order Specific  Question:   Diagnosis:     Answer:   Preop examination [116252]    HIV 1/2 Ag/Ab (4th Gen)     Standing Status:   Future     Standing Expiration Date:   5/14/2023    Ambulatory referral/consult to General Surgery     Standing Status:   Future     Standing Expiration Date:   4/14/2023     Referral Priority:   Routine     Referral Type:   Consultation     Referral Reason:   Specialty Services Required     Referred to Provider:   Jimmy Ferrari MD     Requested Specialty:   General Surgery     Number of Visits Requested:   1    Case Request Endoscopy: COLONOSCOPY     Order Specific Question:   Pre-op Diagnosis     Answer:   Colon cancer screening [640910]     Order Specific Question:   CPT Code:     Answer:   TN COLORECTAL CANCER SCREEN RESULTS DOCUMENT/REVIEW [3017F]     Order Specific Question:   Case Referring Provider     Answer:   DEBBIE FAIR [[id number]]     Order Specific Question:   CPT Code:     Answer:   TN COLON CA SCRN NOT HI RSK IND []     Order Specific Question:   Medical Necessity:     Answer:   Medically Non-Urgent [100]     Order Specific Question:   Is an on-site pathologist required for this procedure?     Answer:   N/A       Medication List with Changes/Refills   New Medications    SODIUM,POTASSIUM,MAG SULFATES (SUPREP BOWEL PREP KIT) 17.5-3.13-1.6 GRAM SOLR    Take as instructed on prep sheet   Changed and/or Refilled Medications    Modified Medication Previous Medication    PANTOPRAZOLE (PROTONIX) 40 MG TABLET pantoprazole (PROTONIX) 40 MG tablet       Take 1 tablet (40 mg total) by mouth once daily.    TAKE 1 TABLET BY MOUTH ONCE DAILY      Medication List with Changes/Refills   New Medications    SODIUM,POTASSIUM,MAG SULFATES (SUPREP BOWEL PREP KIT) 17.5-3.13-1.6 GRAM SOLR    Take as instructed on prep sheet       Start Date: 3/14/2022 End Date: --   Changed and/or Refilled Medications    Modified Medication Previous Medication    PANTOPRAZOLE (PROTONIX) 40 MG TABLET pantoprazole  (PROTONIX) 40 MG tablet       Take 1 tablet (40 mg total) by mouth once daily.    TAKE 1 TABLET BY MOUTH ONCE DAILY       Start Date: 3/14/2022 End Date: --    Start Date: 11/12/2021End Date: 3/14/2022

## 2022-03-15 ENCOUNTER — TELEPHONE (OUTPATIENT)
Dept: FAMILY MEDICINE | Facility: CLINIC | Age: 52
End: 2022-03-15
Payer: COMMERCIAL

## 2022-03-15 NOTE — TELEPHONE ENCOUNTER
Spoke with pts wife, advised that the appt on Monday is with gen surgery and that they will be calling them to schedule his c-scope.

## 2022-03-15 NOTE — TELEPHONE ENCOUNTER
----- Message from Nieves Amaro sent at 3/15/2022  8:11 AM CDT -----  States he has a test on Monday. States she doesn't know what test it is, she didn't go with him to the doctor. States the prep was called in. Please call Jessica Haynes [phone]. Thank you

## 2022-03-16 LAB — HIV 1+2 AB+HIV1 P24 AG SERPL QL IA: NEGATIVE

## 2022-03-18 ENCOUNTER — PATIENT OUTREACH (OUTPATIENT)
Dept: ADMINISTRATIVE | Facility: OTHER | Age: 52
End: 2022-03-18
Payer: COMMERCIAL

## 2022-03-18 NOTE — PROGRESS NOTES
I have reviewed the labs and am recommending the following:    HIV SCREEN NORMAL-The screening for HIV is negative.  LIPID NORMAL SCREEN-The cholesterol panel screening showed levels that are considered at target at this time.  Recheck each year.   PSA NORMAL-The PSA screening for prostate cancer is normal.  Recheck annually.      Dr. Oswaldo Childress

## 2022-03-21 ENCOUNTER — TELEPHONE (OUTPATIENT)
Dept: SURGERY | Facility: CLINIC | Age: 52
End: 2022-03-21
Payer: COMMERCIAL

## 2022-03-21 NOTE — TELEPHONE ENCOUNTER
----- Message from Aisha House sent at 3/21/2022  8:40 AM CDT -----  Contact: [phone] wife  Type:  Patient Returning Call    Who Called:wife    Who Left Message for Patient:nurse   Does the patient know what this is regarding?:appt   Would the patient rather a call back or a response via [facility]? Call back   Best Call Back Number:[phone]  Additional Information:

## 2022-03-28 ENCOUNTER — OFFICE VISIT (OUTPATIENT)
Dept: SURGERY | Facility: CLINIC | Age: 52
End: 2022-03-28
Payer: COMMERCIAL

## 2022-03-28 VITALS
DIASTOLIC BLOOD PRESSURE: 82 MMHG | TEMPERATURE: 98 F | WEIGHT: 171.31 LBS | SYSTOLIC BLOOD PRESSURE: 137 MMHG | BODY MASS INDEX: 31.33 KG/M2 | HEART RATE: 66 BPM

## 2022-03-28 DIAGNOSIS — K80.20 CALCULUS OF GALLBLADDER WITHOUT CHOLECYSTITIS WITHOUT OBSTRUCTION: Primary | ICD-10-CM

## 2022-03-28 DIAGNOSIS — K21.9 GASTROESOPHAGEAL REFLUX DISEASE WITHOUT ESOPHAGITIS: ICD-10-CM

## 2022-03-28 DIAGNOSIS — R93.2 ABNORMAL CT SCAN, GALLBLADDER: ICD-10-CM

## 2022-03-28 DIAGNOSIS — K44.9 HIATAL HERNIA: ICD-10-CM

## 2022-03-28 PROCEDURE — 3079F DIAST BP 80-89 MM HG: CPT | Mod: CPTII,S$GLB,, | Performed by: SURGERY

## 2022-03-28 PROCEDURE — 3075F PR MOST RECENT SYSTOLIC BLOOD PRESS GE 130-139MM HG: ICD-10-PCS | Mod: CPTII,S$GLB,, | Performed by: SURGERY

## 2022-03-28 PROCEDURE — 99203 OFFICE O/P NEW LOW 30 MIN: CPT | Mod: S$GLB,,, | Performed by: SURGERY

## 2022-03-28 PROCEDURE — 99203 PR OFFICE/OUTPT VISIT, NEW, LEVL III, 30-44 MIN: ICD-10-PCS | Mod: S$GLB,,, | Performed by: SURGERY

## 2022-03-28 PROCEDURE — 3079F PR MOST RECENT DIASTOLIC BLOOD PRESSURE 80-89 MM HG: ICD-10-PCS | Mod: CPTII,S$GLB,, | Performed by: SURGERY

## 2022-03-28 PROCEDURE — 99999 PR PBB SHADOW E&M-EST. PATIENT-LVL IV: ICD-10-PCS | Mod: PBBFAC,,, | Performed by: SURGERY

## 2022-03-28 PROCEDURE — 3008F BODY MASS INDEX DOCD: CPT | Mod: CPTII,S$GLB,, | Performed by: SURGERY

## 2022-03-28 PROCEDURE — 3075F SYST BP GE 130 - 139MM HG: CPT | Mod: CPTII,S$GLB,, | Performed by: SURGERY

## 2022-03-28 PROCEDURE — 1159F PR MEDICATION LIST DOCUMENTED IN MEDICAL RECORD: ICD-10-PCS | Mod: CPTII,S$GLB,, | Performed by: SURGERY

## 2022-03-28 PROCEDURE — 99999 PR PBB SHADOW E&M-EST. PATIENT-LVL IV: CPT | Mod: PBBFAC,,, | Performed by: SURGERY

## 2022-03-28 PROCEDURE — 3008F PR BODY MASS INDEX (BMI) DOCUMENTED: ICD-10-PCS | Mod: CPTII,S$GLB,, | Performed by: SURGERY

## 2022-03-28 PROCEDURE — 1159F MED LIST DOCD IN RCRD: CPT | Mod: CPTII,S$GLB,, | Performed by: SURGERY

## 2022-03-28 PROCEDURE — 1160F PR REVIEW ALL MEDS BY PRESCRIBER/CLIN PHARMACIST DOCUMENTED: ICD-10-PCS | Mod: CPTII,S$GLB,, | Performed by: SURGERY

## 2022-03-28 PROCEDURE — 1160F RVW MEDS BY RX/DR IN RCRD: CPT | Mod: CPTII,S$GLB,, | Performed by: SURGERY

## 2022-03-28 NOTE — PROGRESS NOTES
General Surgery Clinic  History and Physical    Patient Name: Young Haynes Jr.  YOB: 1970 (51 y.o.)  MRN: [id number]  Today's Date: 03/28/2022    Referring Md:   Oswaldo Childress [place]  [id number] [facility] [place] [address]    SUBJECTIVE:     Chief Complaint: Concern for gallbladder dysfunction     History of Present Illness:  Young Haynes Jr. is a 51 y.o. male who presents to the clinic today after some episodes of nausea and chest discomfort that started a little over a year ago. He states that in January of 2021 he ate some spicy boudin and ultimately went to an outside ED with chest discomfort and nausea. A CT scan performed at the outside facility demonstrated the presence of some gallstones, sludge, and wall thickening. Patient does have a history of GERD and was treated with a GI cocktail in the ED, which resolved his symptoms. He states that he has experienced a few similar episodes since but has not needed to return to the ED again. He states that he takes Protonix daily for GERD. He recently saw his PCP who ordered a HIDA scan, but it has not yet been performed. Patient denies any abdominal pain and cannot identify any specific trigger foods other than the boudin.       Review of patient's allergies indicates:  No Known Allergies    Past Medical History:   Diagnosis Date    GERD (gastroesophageal reflux disease)      No past surgical history on file.  Family History   Problem Relation Age of Onset    Hypertension Father     Diabetes Father     Cancer Maternal Grandfather     Diabetes Maternal Grandfather     Heart attack Paternal Grandfather     Stroke Paternal Grandfather     Diabetes Paternal Grandfather      Social History     Tobacco Use    Smoking status: Never Smoker    Smokeless tobacco: Never Used   Substance Use Topics    Alcohol use: No    Drug use: No        Review of Systems:  Review of Systems   HENT: Negative for hearing loss.    Eyes: Negative for discharge.    Respiratory: Negative for wheezing.    Cardiovascular: Negative for chest pain and palpitations.   Gastrointestinal: Positive for heartburn and nausea. Negative for blood in stool, constipation, diarrhea and vomiting.   Genitourinary: Negative for hematuria and urgency.   Musculoskeletal: Negative for neck pain.   Neurological: Negative for weakness and headaches.   Endo/Heme/Allergies: Negative for polydipsia.       OBJECTIVE:     Vital Signs (Most Recent)  /82   Pulse 66   Temp 97.7 °F (36.5 °C) (Tympanic)   Wt 77.7 kg (171 lb 4.8 oz)   BMI 31.33 kg/m²     Physical Exam  Vitals reviewed.   Constitutional:       General: He is not in acute distress.     Appearance: Normal appearance. He is not ill-appearing or toxic-appearing.   HENT:      Head: Normocephalic and atraumatic.   Eyes:      Extraocular Movements: Extraocular movements intact.      Conjunctiva/sclera: Conjunctivae normal.   Cardiovascular:      Rate and Rhythm: Normal rate.   Pulmonary:      Effort: Pulmonary effort is normal. No respiratory distress.   Abdominal:      General: Abdomen is flat. There is no distension.      Palpations: Abdomen is soft.      Tenderness: There is no abdominal tenderness. Negative signs include Briseno's sign.      Hernia: No hernia is present.   Musculoskeletal:         General: Normal range of motion.      Cervical back: Normal range of motion.   Skin:     General: Skin is warm and dry.      Capillary Refill: Capillary refill takes less than 2 seconds.   Neurological:      General: No focal deficit present.      Mental Status: He is alert and oriented to person, place, and time.   Psychiatric:         Mood and Affect: Mood normal.         Behavior: Behavior normal.         Thought Content: Thought content normal.         Judgment: Judgment normal.         Imaging: CT Angiogram PE Protocol W Contrast    Anatomical Region Laterality Modality   Chest -- Computed Tomography   Vascular -- --       Impression     1.   No evidence of pulmonary thromboembolism.    2.  Mild mosaic attenuation of the lung bases which may indicate air trapping or volume loss.   3. Gallstones within a distended gallbladder with associated gallbladder wall thickening. Findings are suspicious for acute cholecystitis.         Electronically signed by Monster Shi MD on 1/5/2021 10:25 PM    Narrative    REASON FOR EXAM: PE suspected, high pretest prob     TECHNICAL FACTORS: Multiple contiguous axial CT images were obtained of the chest after the administration of intravenous contrast. ASIR was utilized for radiation reduction. 2-D sagittal and coronal reformatted images were performed. 3-D MIP images were   also obtained with postprocessing performed without the use of an independent workstation under concurrent radiologist supervision.     COMPARISON: None     FINDINGS: The thyroid gland and structures at the base the neck are unremarkable.  The thoracic aorta is normal in course and contour. There is no aneurysm or dissection.  There is no mediastinal or hilar lymphadenopathy.  Heart size is within normal   limits. There is no pericardial fluid or evidence of right heart strain.       The pulmonary arteries distribute and opacify normally. There is no filling defect observed.  Symmetrical pulmonary aeration. Mild mosaic attenuation in the lung bases which may indicate air trapping. No focal pneumonia, pleural effusion, or   pneumothorax.       Partially imaged gallbladder appears mildly distended. There are radiopaque gallstones noted within the lumen and there appears to be mild gallbladder wall thickening or regional stranding. Upper abdominal structures otherwise unremarkable. There are   degenerative changes of the spine. There is an old left posterior eighth rib fracture.     Procedure Note    Monster Shi MD - 01/05/2021   Formatting of this note might be different from the original.   REASON FOR EXAM: PE suspected, high pretest prob      TECHNICAL FACTORS: Multiple contiguous axial CT images were obtained of the chest after the administration of intravenous contrast. ASIR was utilized for radiation reduction. 2-D sagittal and coronal reformatted images were performed. 3-D MIP images were also obtained with postprocessing performed without the use of an independent workstation under concurrent radiologist supervision.     COMPARISON: None     FINDINGS: The thyroid gland and structures at the base the neck are unremarkable.  The thoracic aorta is normal in course and contour. There is no aneurysm or dissection.  There is no mediastinal or hilar lymphadenopathy.  Heart size is within normal limits. There is no pericardial fluid or evidence of right heart strain.       The pulmonary arteries distribute and opacify normally. There is no filling defect observed.  Symmetrical pulmonary aeration. Mild mosaic attenuation in the lung bases which may indicate air trapping. No focal pneumonia, pleural effusion, or pneumothorax.       Partially imaged gallbladder appears mildly distended. There are radiopaque gallstones noted within the lumen and there appears to be mild gallbladder wall thickening or regional stranding. Upper abdominal structures otherwise unremarkable. There are degenerative changes of the spine. There is an old left posterior eighth rib fracture.     IMPRESSION:    1.  No evidence of pulmonary thromboembolism.    2.  Mild mosaic attenuation of the lung bases which may indicate air trapping or volume loss.   3. Gallstones within a distended gallbladder with associated gallbladder wall thickening. Findings are suspicious for acute cholecystitis.         Electronically signed by Monster Shi MD on 1/5/2021 10:25 PM  Exam End: 01/05/21  9:59 PM    Specimen Collected: 01/05/21 10:02 PM Last Resulted: 01/05/21 10:25 PM   Received From: [facility]  Result Received: 03/14/22  3:01 PM       Narrative    REASON FOR EXAM: possible  cholecystitis seen on CT     TECHNICAL FACTORS: B-mode and Doppler sonographic images were obtained of the gallbladder and common bile duct.     TECHNOLOGIST: Zabrina Duran     COMPARISON: None. Correlation with CT angiogram PE protocol January 5, 2021     FINDINGS: Borderline gallbladder distention with gallbladder wall thickening measuring up to 5 mm. No definitive echogenic shadowing gallstone.  The common bile duct measures 4.8 mm in diameter. Briseno sign is not elicited on real-time exam.  The   pancreas is obscured by bowel gas. Limited views of the liver demonstrate no focal finding.     IMPRESSION:   Borderline gallbladder distention and gallbladder wall thickening. No definitive gallstone.  Previously noted presumed gallstones on CT of the chest are not readily apparent.         Electronically signed by Monster Shi MD on 1/5/2021 11:15 PM        Ultrasound:     Monster Shi MD - 01/05/2021   Formatting of this note might be different from the original.   REASON FOR EXAM: possible cholecystitis seen on CT     TECHNICAL FACTORS: B-mode and Doppler sonographic images were obtained of the gallbladder and common bile duct.     TECHNOLOGIST: Zabrina Duran     COMPARISON: None. Correlation with CT angiogram PE protocol January 5, 2021     FINDINGS: Borderline gallbladder distention with gallbladder wall thickening measuring up to 5 mm. No definitive echogenic shadowing gallstone.  The common bile duct measures 4.8 mm in diameter. Briseno sign is not elicited on real-time exam.  The pancreas is obscured by bowel gas. Limited views of the liver demonstrate no focal finding.     IMPRESSION:   Borderline gallbladder distention and gallbladder wall thickening. No definitive gallstone.  Previously noted presumed gallstones on CT of the chest are not readily apparent.         Electronically signed by Monster Shi MD on 1/5/2021 11:15 PM        ASSESSMENT/PLAN:     Young Haynes  is a 51 y.o. male  who presents to the clinic today with some concern for gallbadder dysfunstion/biliary colic.    Plan as follows:  - HIDA scan on 4/8  - correlate symptoms with cck. Discussed can consider cholecystectomy if symptoms correlate (?stones noted on ct but not seen on U/S)  - Endoscopy 4/5   - Continue Protonix  - Avoid trigger foods  - F/u pending results of HIDA scan

## 2022-03-29 ENCOUNTER — PATIENT MESSAGE (OUTPATIENT)
Dept: ENDOSCOPY | Facility: HOSPITAL | Age: 52
End: 2022-03-29
Payer: COMMERCIAL

## 2022-04-05 ENCOUNTER — HOSPITAL ENCOUNTER (OUTPATIENT)
Facility: HOSPITAL | Age: 52
Discharge: HOME | End: 2022-04-05
Attending: FAMILY MEDICINE | Admitting: FAMILY MEDICINE
Payer: COMMERCIAL

## 2022-04-05 ENCOUNTER — ANESTHESIA EVENT (OUTPATIENT)
Dept: ENDOSCOPY | Facility: HOSPITAL | Age: 52
End: 2022-04-05
Payer: COMMERCIAL

## 2022-04-05 ENCOUNTER — ANESTHESIA (OUTPATIENT)
Dept: ENDOSCOPY | Facility: HOSPITAL | Age: 52
End: 2022-04-05
Payer: COMMERCIAL

## 2022-04-05 VITALS
BODY MASS INDEX: 31.83 KG/M2 | TEMPERATURE: 98 F | HEIGHT: 62 IN | DIASTOLIC BLOOD PRESSURE: 78 MMHG | WEIGHT: 173 LBS | HEART RATE: 74 BPM | OXYGEN SATURATION: 100 % | RESPIRATION RATE: 15 BRPM | SYSTOLIC BLOOD PRESSURE: 123 MMHG

## 2022-04-05 DIAGNOSIS — Z12.11 COLON CANCER SCREENING: Primary | ICD-10-CM

## 2022-04-05 PROCEDURE — G0121 COLON CA SCRN NOT HI RSK IND: HCPCS | Mod: ,,, | Performed by: FAMILY MEDICINE

## 2022-04-05 PROCEDURE — G0121 COLON CA SCRN NOT HI RSK IND: ICD-10-PCS | Mod: ,,, | Performed by: FAMILY MEDICINE

## 2022-04-05 PROCEDURE — 37000008 HC ANESTHESIA 1ST 15 MINUTES: Performed by: FAMILY MEDICINE

## 2022-04-05 PROCEDURE — 37000009 HC ANESTHESIA EA ADD 15 MINS: Performed by: FAMILY MEDICINE

## 2022-04-05 PROCEDURE — 63600175 PHARM REV CODE 636 W HCPCS: Performed by: NURSE ANESTHETIST, CERTIFIED REGISTERED

## 2022-04-05 PROCEDURE — G0121 COLON CA SCRN NOT HI RSK IND: HCPCS | Performed by: FAMILY MEDICINE

## 2022-04-05 PROCEDURE — 25000003 PHARM REV CODE 250: Performed by: FAMILY MEDICINE

## 2022-04-05 RX ORDER — PROPOFOL 10 MG/ML
VIAL (ML) INTRAVENOUS
Status: DISCONTINUED | OUTPATIENT
Start: 2022-04-05 | End: 2022-04-05

## 2022-04-05 RX ORDER — SODIUM CHLORIDE 9 MG/ML
INJECTION, SOLUTION INTRAVENOUS CONTINUOUS
Status: DISCONTINUED | OUTPATIENT
Start: 2022-04-05 | End: 2022-04-05 | Stop reason: HOSPADM

## 2022-04-05 RX ORDER — DEXTROMETHORPHAN/PSEUDOEPHED 2.5-7.5/.8
DROPS ORAL
Status: COMPLETED | OUTPATIENT
Start: 2022-04-05 | End: 2022-04-05

## 2022-04-05 RX ORDER — SODIUM CHLORIDE, SODIUM LACTATE, POTASSIUM CHLORIDE, CALCIUM CHLORIDE 600; 310; 30; 20 MG/100ML; MG/100ML; MG/100ML; MG/100ML
INJECTION, SOLUTION INTRAVENOUS CONTINUOUS PRN
Status: DISCONTINUED | OUTPATIENT
Start: 2022-04-05 | End: 2022-04-05

## 2022-04-05 RX ADMIN — PROPOFOL 20 MG: 10 INJECTION, EMULSION INTRAVENOUS at 01:04

## 2022-04-05 RX ADMIN — PROPOFOL 100 MG: 10 INJECTION, EMULSION INTRAVENOUS at 01:04

## 2022-04-05 RX ADMIN — SODIUM CHLORIDE, SODIUM LACTATE, POTASSIUM CHLORIDE, AND CALCIUM CHLORIDE: .6; .31; .03; .02 INJECTION, SOLUTION INTRAVENOUS at 01:04

## 2022-04-05 RX ADMIN — PROPOFOL 40 MG: 10 INJECTION, EMULSION INTRAVENOUS at 01:04

## 2022-04-05 RX ADMIN — SIMETHICONE 66.6 MG: 20 SUSPENSION/ DROPS ORAL at 01:04

## 2022-04-05 NOTE — TRANSFER OF CARE
"Anesthesia Transfer of Care Note    Patient: Young Haynes JrWarren    Procedure(s) Performed: Procedure(s) (LRB):  COLONOSCOPY (N/A)    Patient location: PACU    Anesthesia Type: MAC    Transport from OR: Transported from OR on room air with adequate spontaneous ventilation    Post pain: adequate analgesia    Post assessment: no apparent anesthetic complications and tolerated procedure well    Post vital signs: stable    Level of consciousness: sedated    Nausea/Vomiting: no nausea/vomiting    Complications: none    Transfer of care protocol was followed      Last vitals:   Visit Vitals  /68   Pulse 85   Temp 36.8 °C (98.2 °F)   Resp 18   Ht 5' 2" (1.575 m)   Wt 78.5 kg (173 lb)   SpO2 98%   BMI 31.64 kg/m²     "

## 2022-04-05 NOTE — ANESTHESIA PREPROCEDURE EVALUATION
04/05/2022  Young Haynes Jr. is a 51 y.o., male.      Pre-op Assessment    I have reviewed the Patient Summary Reports.     I have reviewed the Nursing Notes. I have reviewed the NPO Status.   I have reviewed the Medications.     Review of Systems  Anesthesia Hx:  No problems with previous Anesthesia  Denies Family Hx of Anesthesia complications.   Denies Personal Hx of Anesthesia complications.   Social:  Non-Smoker, No Alcohol Use    Hematology/Oncology:  Hematology Normal   Oncology Normal     Cardiovascular:   Denies Hypertension.  Denies MI.   Denies CABG/stent.  Angina    Pulmonary:   Denies COPD.  Denies Asthma.  Denies Sleep Apnea.    Renal/:   Denies Chronic Renal Disease.  Horseshoe kidney   Hepatic/GI:   Bowel Prep. Hiatal Hernia, GERD Denies Liver Disease. Denies Hepatitis.    Musculoskeletal:  Musculoskeletal Normal    Neurological:   Denies CVA. Denies Seizures.    Endocrine:   Denies Diabetes. Denies Hypothyroidism. Denies Hyperthyroidism.  Obesity / BMI > 30      Physical Exam    Airway:  Mallampati: II   Mouth Opening: Normal    Dental:  Intact    Chest/Lungs:  Clear to auscultation, Normal Respiratory Rate    Heart:  Rate: Normal  Rhythm: Regular Rhythm        Anesthesia Plan  Type of Anesthesia, risks & benefits discussed:    Anesthesia Type: MAC  Intra-op Monitoring Plan: Standard ASA Monitors  Induction:  IV  Informed Consent: Informed consent signed with the Patient and all parties understand the risks and agree with anesthesia plan.  All questions answered.   ASA Score: 2  Day of Surgery Review of History & Physical: H&P Update referred to the surgeon/provider.    Ready For Surgery From Anesthesia Perspective.     .

## 2022-04-05 NOTE — PLAN OF CARE
Wife at b/s  Dr reed came to bedside to discuss findings. Vital signs stable, no patient c/o nausea/vomitting, no abdominal pain, no gi bleeding. Patient to be discharged from unit.

## 2022-04-05 NOTE — [employer] PATIENT INSTRUCTIONS
Discharge Summary/Instructions after an Endoscopic Procedure  Patient Name: Young Haynes  Patient MRN: [id number]  Patient YOB: 1970 Tuesday, April 5, 2022 Oswaldo Childress MD  Dear patient,  As a result of recent federal legislation (The Federal Cures Act), you may   receive lab or pathology results from your procedure in your [facility]   account before your physician is able to contact you. Your physician or   their representative will relay the results to you with their   recommendations at their soonest availability.  Thank you,  RESTRICTIONS:  During your procedure today, you received medications for sedation.  These   medications may affect your judgment, balance and coordination.  Therefore,   for 24 hours, you have the following restrictions:   - DO NOT drive a car, operate machinery, make legal/financial decisions,   sign important papers or drink alcohol.    ACTIVITY:  Today: no heavy lifting, straining or running due to procedural   sedation/anesthesia.  The following day: return to full activity including work.  DIET:  Eat and drink normally unless instructed otherwise.     TREATMENT FOR COMMON SIDE EFFECTS:  - Mild abdominal pain, nausea, belching, bloating or excessive gas:  rest,   eat lightly and use a heating pad.  - Sore Throat: treat with throat lozenges and/or gargle with warm salt   water.  - Because air was used during the procedure, expelling large amounts of air   from your rectum or belching is normal.  - If a bowel prep was taken, you may not have a bowel movement for 1-3 days.    This is normal.  SYMPTOMS TO WATCH FOR AND REPORT TO YOUR PHYSICIAN:  1. Abdominal pain or bloating, other than gas cramps.  2. Chest pain.  3. Back pain.  4. Signs of infection such as: chills or fever occurring within 24 hours   after the procedure.  5. Rectal bleeding, which would show as bright red, maroon, or black stools.   (A tablespoon of blood from the rectum is not serious, especially if    hemorrhoids are present.)  6. Vomiting.  7. Weakness or dizziness.  GO DIRECTLY TO THE NEAREST EMERGENCY ROOM IF YOU HAVE ANY OF THE FOLLOWING:      Difficulty breathing              Chills and/or fever over 101 F   Persistent vomiting and/or vomiting blood   Severe abdominal pain   Severe chest pain   Black, tarry stools   Bleeding- more than one tablespoon   Any other symptom or condition that you feel may need urgent attention  Your doctor recommends these additional instructions:  If any biopsies were taken, your doctors clinic will contact you in 1 to 2   weeks with any results.  - Patient has a contact number available for emergencies.  The signs and   symptoms of potential delayed complications were discussed with the   patient.  Return to normal activities tomorrow.  Written discharge   instructions were provided to the patient.   - Resume previous diet.   - Continue present medications.   - Repeat colonoscopy in 10 years for screening purposes.   - Discharge patient to home (via wheelchair).  For questions, problems or results please call your physician Oswaldo Childress MD at Work:  [phone]  If you have any questions about the above instructions, call the GI   department at [phone] or call the endoscopy unit at [phone]   from 7am until 3 pm.  [facility] [place], EMERGENCY ROOM PHONE NUMBER:   [phone]  IF A COMPLICATION OR EMERGENCY SITUATION ARISES AND YOU ARE UNABLE TO REACH   YOUR PHYSICIAN - GO DIRECTLY TO THE EMERGENCY ROOM.  I have read or have had read to me these discharge instructions for my   procedure and have received a written copy.  I understand these   instructions and will follow-up with my physician if I have any questions.     __________________________________       _____________________________________  Nurse Signature                                          Patient/Designated   Responsible Party Signature  Oswaldo Childress MD  4/5/2022  1:56:37 PM  This report has been verified and signed electronically.  Dear patient,  As a result of recent federal legislation (The Federal Cures Act), you may   receive lab or pathology results from your procedure in your [facility]   account before your physician is able to contact you. Your physician or   their representative will relay the results to you with their   recommendations at their soonest availability.  Thank you,  [employer]ATION

## 2022-04-05 NOTE — H&P
Short Stay Endoscopy History and Physical    PCP - Oswaldo Childress MD    Procedure - Colonoscopy  ASA - 2  Mallampati - per anesthesia  History of Anesthesia problems - no  Family history Anesthesia problems -  no     HPI:  This is a 51 y.o. male here for evaluation of :   Active Hospital Problems    Diagnosis  POA    *Colon cancer screening [Z12.11]  Not Applicable      Resolved Hospital Problems   No resolved problems to display.         Health Maintenance       Date Due Completion Date    Colorectal Cancer Screening Never done ---    Influenza Vaccine (1) Never done ---    COVID-19 Vaccine (3 - Booster for Moderna series) 12/24/2021 7/24/2021    Shingles Vaccine (2 of 2) 05/09/2022 3/14/2022    Lipid Panel 03/14/2027 3/14/2022    TETANUS VACCINE 10/09/2031 10/9/2021          Screening - Yes  History of polyps - No     Diarrhea - no  Anemia - no  Blood in stools - no  Abdominal pain - no  Other - no    ROS:  CONSTITUTIONAL: Denies weight change,  fatigue, fevers, chills, night sweats.  CARDIOVASCULAR: Denies chest pain, shortness of breath, orthopnea and edema.  RESPIRATORY: Denies cough, hemoptysis, dyspnea, and wheezing.  GI: See HPI.    Medical History:   Past Medical History:   Diagnosis Date    GERD (gastroesophageal reflux disease)        Surgical History:   History reviewed. No pertinent surgical history.    Family History:   Family History   Problem Relation Age of Onset    Hypertension Father     Diabetes Father     Cancer Maternal Grandfather     Diabetes Maternal Grandfather     Heart attack Paternal Grandfather     Stroke Paternal Grandfather     Diabetes Paternal Grandfather        Social History:   Social History     Tobacco Use    Smoking status: Never Smoker    Smokeless tobacco: Never Used   Substance Use Topics    Alcohol use: No    Drug use: No       Allergies:   Review of patient's allergies indicates:  No Known Allergies    Medications:   No current facility-administered  medications on file prior to encounter.     Current Outpatient Medications on File Prior to Encounter   Medication Sig Dispense Refill    pantoprazole (PROTONIX) 40 MG tablet Take 1 tablet (40 mg total) by mouth once daily. 90 tablet 3    sodium,potassium,mag sulfates (SUPREP BOWEL PREP KIT) 17.5-3.13-1.6 gram SolR Take as instructed on prep sheet 354 mL 0       Physical Exam:  Vital Signs:   Vitals:    04/05/22 1247   BP: 125/68   Pulse:    Resp:    Temp:      General Appearance: Well appearing in no acute distress  ENT: OP clear  Chest: CTA B  CV: RRR, no m/r/g  Abd: s/nt/nd/nabs  Ext: no edema    Labs:Reviewed    IMP:  Active Hospital Problems    Diagnosis  POA    *Colon cancer screening [Z12.11]  Not Applicable      Resolved Hospital Problems   No resolved problems to display.         Plan:   I have explained the risks and benefits of colonoscopy to the patient including but not limited to bleeding, perforation, infection, and death. The patient wishes to proceed.

## 2022-05-20 ENCOUNTER — CLINICAL SUPPORT (OUTPATIENT)
Dept: FAMILY MEDICINE | Facility: CLINIC | Age: 52
End: 2022-05-20
Payer: COMMERCIAL

## 2022-05-20 DIAGNOSIS — Z23 IMMUNIZATION DUE: ICD-10-CM

## 2022-05-20 PROCEDURE — 99999 PR PBB SHADOW E&M-EST. PATIENT-LVL I: CPT | Mod: PBBFAC,,,

## 2022-05-20 PROCEDURE — 90750 HZV VACC RECOMBINANT IM: CPT | Mod: S$GLB,,, | Performed by: FAMILY MEDICINE

## 2022-05-20 PROCEDURE — 90471 IMMUNIZATION ADMIN: CPT | Mod: S$GLB,,, | Performed by: FAMILY MEDICINE

## 2022-05-20 PROCEDURE — 90471 ZOSTER RECOMBINANT VACCINE: ICD-10-PCS | Mod: S$GLB,,, | Performed by: FAMILY MEDICINE

## 2022-05-20 PROCEDURE — 99999 PR PBB SHADOW E&M-EST. PATIENT-LVL I: ICD-10-PCS | Mod: PBBFAC,,,

## 2022-05-20 PROCEDURE — 90750 ZOSTER RECOMBINANT VACCINE: ICD-10-PCS | Mod: S$GLB,,, | Performed by: FAMILY MEDICINE

## 2022-05-26 ENCOUNTER — PATIENT MESSAGE (OUTPATIENT)
Dept: FAMILY MEDICINE | Facility: CLINIC | Age: 52
End: 2022-05-26
Payer: COMMERCIAL

## 2022-07-08 ENCOUNTER — PATIENT MESSAGE (OUTPATIENT)
Dept: FAMILY MEDICINE | Facility: CLINIC | Age: 52
End: 2022-07-08
Payer: COMMERCIAL

## 2022-08-09 NOTE — ANESTHESIA POSTPROCEDURE EVALUATION
Anesthesia Post Evaluation    Patient: Young Haynes JrWarren    Procedure(s) Performed: Procedure(s) (LRB):  COLONOSCOPY (N/A)    Final Anesthesia Type: MAC      Patient location during evaluation: PACU  Patient participation: No - Unable to Participate, Sedation  Level of consciousness: sedated  Post-procedure vital signs: reviewed and stable  Pain management: adequate  Airway patency: patent    PONV status at discharge: No PONV  Anesthetic complications: no      Cardiovascular status: blood pressure returned to baseline and hemodynamically stable  Respiratory status: unassisted and spontaneous ventilation  Hydration status: euvolemic  Follow-up not needed.          No case tracking events are documented in the log.      Pain/Mercedez Score: No data recorded      
The patient is a 11y Male complaining of abdominal pain.

## 2022-08-16 NOTE — PROGRESS NOTES
The patient has a normal CMP.  Please ask the patient to recheck the CMP in 1 year at the time of the annual physical if it is still indicated.    -The patient has an acceptable lipid profile.  No medications are needed based on this.    [de-identified] : 15 year old male  ([employer], [employer])   left knee pain since 8/15/2022 when pt. hyperextended knee while playing football.  pain located at the anterior and med, lateral aspect of left knee with associated locking, unable to fully extend knee.  worse with weight bearing activity, better at rest.  has tried icing, Advil\par

## 2022-12-23 ENCOUNTER — PATIENT MESSAGE (OUTPATIENT)
Dept: FAMILY MEDICINE | Facility: CLINIC | Age: 52
End: 2022-12-23
Payer: COMMERCIAL

## 2022-12-23 RX ORDER — BENZONATATE 200 MG/1
200 CAPSULE ORAL 3 TIMES DAILY PRN
Qty: 30 CAPSULE | Refills: 0 | Status: SHIPPED | OUTPATIENT
Start: 2022-12-23 | End: 2023-04-26

## 2023-04-26 ENCOUNTER — HOSPITAL ENCOUNTER (OUTPATIENT)
Dept: RADIOLOGY | Facility: HOSPITAL | Age: 53
Discharge: HOME | End: 2023-04-26
Attending: PHYSICIAN ASSISTANT
Payer: COMMERCIAL

## 2023-04-26 ENCOUNTER — OFFICE VISIT (OUTPATIENT)
Dept: FAMILY MEDICINE | Facility: CLINIC | Age: 53
End: 2023-04-26
Payer: COMMERCIAL

## 2023-04-26 VITALS
HEIGHT: 62 IN | WEIGHT: 169.81 LBS | BODY MASS INDEX: 31.25 KG/M2 | DIASTOLIC BLOOD PRESSURE: 87 MMHG | SYSTOLIC BLOOD PRESSURE: 131 MMHG | HEART RATE: 72 BPM

## 2023-04-26 DIAGNOSIS — M54.50 ACUTE MIDLINE LOW BACK PAIN WITHOUT SCIATICA: Primary | ICD-10-CM

## 2023-04-26 DIAGNOSIS — M54.50 ACUTE MIDLINE LOW BACK PAIN WITHOUT SCIATICA: ICD-10-CM

## 2023-04-26 PROCEDURE — 3079F DIAST BP 80-89 MM HG: CPT | Mod: CPTII,S$GLB,, | Performed by: PHYSICIAN ASSISTANT

## 2023-04-26 PROCEDURE — 1160F PR REVIEW ALL MEDS BY PRESCRIBER/CLIN PHARMACIST DOCUMENTED: ICD-10-PCS | Mod: CPTII,S$GLB,, | Performed by: PHYSICIAN ASSISTANT

## 2023-04-26 PROCEDURE — 72110 X-RAY EXAM L-2 SPINE 4/>VWS: CPT | Mod: TC,PO

## 2023-04-26 PROCEDURE — 99214 OFFICE O/P EST MOD 30 MIN: CPT | Mod: S$GLB,,, | Performed by: PHYSICIAN ASSISTANT

## 2023-04-26 PROCEDURE — 3079F PR MOST RECENT DIASTOLIC BLOOD PRESSURE 80-89 MM HG: ICD-10-PCS | Mod: CPTII,S$GLB,, | Performed by: PHYSICIAN ASSISTANT

## 2023-04-26 PROCEDURE — 99999 PR PBB SHADOW E&M-EST. PATIENT-LVL IV: ICD-10-PCS | Mod: PBBFAC,,, | Performed by: PHYSICIAN ASSISTANT

## 2023-04-26 PROCEDURE — 3075F PR MOST RECENT SYSTOLIC BLOOD PRESS GE 130-139MM HG: ICD-10-PCS | Mod: CPTII,S$GLB,, | Performed by: PHYSICIAN ASSISTANT

## 2023-04-26 PROCEDURE — 72110 X-RAY EXAM L-2 SPINE 4/>VWS: CPT | Mod: 26,,, | Performed by: RADIOLOGY

## 2023-04-26 PROCEDURE — 1159F PR MEDICATION LIST DOCUMENTED IN MEDICAL RECORD: ICD-10-PCS | Mod: CPTII,S$GLB,, | Performed by: PHYSICIAN ASSISTANT

## 2023-04-26 PROCEDURE — 1160F RVW MEDS BY RX/DR IN RCRD: CPT | Mod: CPTII,S$GLB,, | Performed by: PHYSICIAN ASSISTANT

## 2023-04-26 PROCEDURE — 72110 XR LUMBAR SPINE COMPLETE 5 VIEW: ICD-10-PCS | Mod: 26,,, | Performed by: RADIOLOGY

## 2023-04-26 PROCEDURE — 99999 PR PBB SHADOW E&M-EST. PATIENT-LVL IV: CPT | Mod: PBBFAC,,, | Performed by: PHYSICIAN ASSISTANT

## 2023-04-26 PROCEDURE — 1159F MED LIST DOCD IN RCRD: CPT | Mod: CPTII,S$GLB,, | Performed by: PHYSICIAN ASSISTANT

## 2023-04-26 PROCEDURE — 3008F BODY MASS INDEX DOCD: CPT | Mod: CPTII,S$GLB,, | Performed by: PHYSICIAN ASSISTANT

## 2023-04-26 PROCEDURE — 99214 PR OFFICE/OUTPT VISIT, EST, LEVL IV, 30-39 MIN: ICD-10-PCS | Mod: S$GLB,,, | Performed by: PHYSICIAN ASSISTANT

## 2023-04-26 PROCEDURE — 3075F SYST BP GE 130 - 139MM HG: CPT | Mod: CPTII,S$GLB,, | Performed by: PHYSICIAN ASSISTANT

## 2023-04-26 PROCEDURE — 3008F PR BODY MASS INDEX (BMI) DOCUMENTED: ICD-10-PCS | Mod: CPTII,S$GLB,, | Performed by: PHYSICIAN ASSISTANT

## 2023-04-26 RX ORDER — TIZANIDINE 4 MG/1
4 TABLET ORAL EVERY 8 HOURS
Qty: 30 TABLET | Refills: 0 | Status: SHIPPED | OUTPATIENT
Start: 2023-04-26 | End: 2023-08-21 | Stop reason: ALTCHOICE

## 2023-04-26 RX ORDER — METHYLPREDNISOLONE 4 MG/1
TABLET ORAL
Qty: 21 TABLET | Refills: 0 | Status: SHIPPED | OUTPATIENT
Start: 2023-04-26

## 2023-04-26 RX ORDER — DIPHENHYDRAMINE HCL 25 MG
25 CAPSULE ORAL EVERY 6 HOURS PRN
COMMUNITY

## 2023-04-26 RX ORDER — MELOXICAM 15 MG/1
15 TABLET ORAL DAILY
Qty: 30 TABLET | Refills: 0 | Status: SHIPPED | OUTPATIENT
Start: 2023-04-26 | End: 2023-05-26

## 2023-04-26 NOTE — PROGRESS NOTES
Assessment/Plan:    Problem List Items Addressed This Visit    None  Visit Diagnoses       Acute midline low back pain without sciatica    -  Primary    Relevant Medications    meloxicam (MOBIC) 15 MG tablet    tiZANidine (ZANAFLEX) 4 MG tablet    methylPREDNISolone (MEDROL DOSEPACK) 4 mg tablet    Other Relevant Orders    X-Ray Lumbar Spine 5 View        -new onset low back pain   -no alarm symptoms or signs present  -will obtain lumbar XR today  -continue conservative treatment, rest, ice/heat applications, PRN anti-inflammatory medication  -consider PT if symptoms persist  -ER precautions for severe or worsening of symptoms    Follow up if symptoms worsen or fail to improve.    Lizabeth Bautista PA-C  _____________________________________________________________________________________________________________________________________________________    CC: low back pain     HPI: Patient is in clinic today as an established patient here for low back pain. Symptom onset was about 2 weeks ago, but has worsened since that time. Denies recent trauma or injuries. He stated that the pain is located along his midline and radiates to his hips bilaterally. He denies lower extremity numbness, tingling, weakness, or urinary/bowel incontinence. He reports doing a lot of heavy lifting at work which makes the pain worse. He also reports difficulty ambulating due to pain. He has been taking Ibuprofen with minimal improvement of pain. He denies prior history of low back pain. He has a history of kidney stones, however, he stated that current symptoms feel different. No other complaints today.     Past Medical History:   Diagnosis Date    GERD (gastroesophageal reflux disease)      Past Surgical History:   Procedure Laterality Date    COLONOSCOPY N/A 04/05/2022    Procedure: COLONOSCOPY;  Surgeon: Oswaldo Childress MD;  [facility];  Service: Endoscopy;  Laterality: N/A;     Review of patient's allergies indicates:  No Known  "Allergies  Social History     Tobacco Use    Smoking status: Never    Smokeless tobacco: Never   Substance Use Topics    Alcohol use: No    Drug use: No     Family History   Problem Relation Age of Onset    Hypertension Father     Diabetes Father     Cancer Maternal Grandfather     Diabetes Maternal Grandfather     Heart attack Paternal Grandfather     Stroke Paternal Grandfather     Diabetes Paternal Grandfather     Cancer Daughter     Stroke Daughter      Current Outpatient Medications on File Prior to Visit   Medication Sig Dispense Refill    diphenhydrAMINE (BENADRYL) 25 mg capsule Take 25 mg by mouth every 6 (six) hours as needed for Itching.      pantoprazole (PROTONIX) 40 MG tablet TAKE 1 TABLET BY MOUTH ONCE DAILY 90 tablet 0    [DISCONTINUED] benzonatate (TESSALON) 200 MG capsule Take 1 capsule (200 mg total) by mouth 3 (three) times daily as needed for Cough. 30 capsule 0     No current facility-administered medications on file prior to visit.       Review of Systems   Constitutional:  Negative for chills, diaphoresis, fatigue and fever.   HENT:  Negative for congestion, ear pain, postnasal drip, sinus pain and sore throat.    Eyes:  Negative for pain and redness.   Respiratory:  Negative for cough, chest tightness and shortness of breath.    Cardiovascular:  Negative for chest pain and leg swelling.   Gastrointestinal:  Negative for abdominal pain, constipation, diarrhea, nausea and vomiting.   Genitourinary:  Negative for dysuria and hematuria.   Musculoskeletal:  Positive for back pain. Negative for arthralgias and joint swelling.   Skin:  Negative for rash.   Neurological:  Negative for dizziness, syncope and headaches.   Psychiatric/Behavioral:  Negative for dysphoric mood. The patient is not nervous/anxious.      Vitals:    04/26/23 1601   BP: 131/87   Pulse: 72   Weight: 77 kg (169 lb 12.8 oz)   Height: 5' 2" (1.575 m)       Wt Readings from Last 3 Encounters:   04/26/23 77 kg (169 lb 12.8 oz) "   04/05/22 78.5 kg (173 lb)   03/28/22 77.7 kg (171 lb 4.8 oz)       Physical Exam  Constitutional:       General: He is not in acute distress.     Appearance: Normal appearance. He is well-developed.   HENT:      Head: Normocephalic and atraumatic.   Eyes:      Conjunctiva/sclera: Conjunctivae normal.   Cardiovascular:      Rate and Rhythm: Normal rate and regular rhythm.      Pulses: Normal pulses.      Heart sounds: Normal heart sounds. No murmur heard.  Pulmonary:      Effort: Pulmonary effort is normal. No respiratory distress.      Breath sounds: Normal breath sounds.   Abdominal:      General: Bowel sounds are normal. There is no distension.      Palpations: Abdomen is soft.      Tenderness: There is no abdominal tenderness.   Musculoskeletal:         General: Normal range of motion.      Cervical back: Normal range of motion and neck supple.      Lumbar back: Tenderness and bony tenderness present. No swelling or deformity. Normal range of motion.   Skin:     General: Skin is warm and dry.      Findings: No rash.   Neurological:      General: No focal deficit present.      Mental Status: He is alert and oriented to person, place, and time.   Psychiatric:         Mood and Affect: Mood normal.         Behavior: Behavior normal.       Health Maintenance   Topic Date Due    Lipid Panel  03/14/2027    TETANUS VACCINE  10/09/2031    Hepatitis C Screening  Completed

## 2023-04-27 ENCOUNTER — PATIENT MESSAGE (OUTPATIENT)
Dept: FAMILY MEDICINE | Facility: CLINIC | Age: 53
End: 2023-04-27
Payer: COMMERCIAL

## 2023-04-27 DIAGNOSIS — K80.20 CALCULUS OF GALLBLADDER WITHOUT CHOLECYSTITIS WITHOUT OBSTRUCTION: Primary | ICD-10-CM

## 2023-04-27 DIAGNOSIS — Z87.442 HISTORY OF KIDNEY STONES: ICD-10-CM

## 2023-04-27 NOTE — TELEPHONE ENCOUNTER
I just reviewed his lumbar XR and their were some degenerative changes seen in the lumbar spine and possible gallstones. I am unsure if this is contributing to his current symptoms, however, we can do an abdominal US to get a better look at the gallbladder. This muscle relaxer should help alleviate some of his symptoms.     I have signed for the following orders AND/OR meds.  Please call the patient and ask the patient to schedule the testing AND/OR inform about any medications that were sent. Medications have been sent to pharmacy listed below      Orders Placed This Encounter   Procedures    US Abdomen Complete     Standing Status:   Future     Standing Expiration Date:   4/27/2024     Order Specific Question:   May the Radiologist modify the order per protocol to meet the clinical needs of the patient?     Answer:   Yes     Order Specific Question:   Release to patient     Answer:   Immediate              [id number] - [place] - [address]  [address]  [place] [address]  Phone: [phone] Fax: [phone]

## 2023-08-02 ENCOUNTER — PATIENT MESSAGE (OUTPATIENT)
Dept: FAMILY MEDICINE | Facility: CLINIC | Age: 53
End: 2023-08-02
Payer: COMMERCIAL

## 2023-08-20 DIAGNOSIS — K21.9 GASTROESOPHAGEAL REFLUX DISEASE WITHOUT ESOPHAGITIS: ICD-10-CM

## 2023-08-20 NOTE — TELEPHONE ENCOUNTER
No care due was identified.  [facility] Embedded Care Due Messages. Reference number: [id number].   8/20/2023 9:24:15 AM CDT

## 2023-08-21 ENCOUNTER — OFFICE VISIT (OUTPATIENT)
Dept: FAMILY MEDICINE | Facility: CLINIC | Age: 53
End: 2023-08-21
Payer: COMMERCIAL

## 2023-08-21 ENCOUNTER — LAB VISIT (OUTPATIENT)
Dept: LAB | Facility: HOSPITAL | Age: 53
End: 2023-08-21
Attending: PHYSICIAN ASSISTANT
Payer: COMMERCIAL

## 2023-08-21 VITALS
DIASTOLIC BLOOD PRESSURE: 89 MMHG | SYSTOLIC BLOOD PRESSURE: 138 MMHG | HEART RATE: 91 BPM | TEMPERATURE: 98 F | WEIGHT: 172 LBS | BODY MASS INDEX: 31.46 KG/M2

## 2023-08-21 DIAGNOSIS — R10.32 LEFT LOWER QUADRANT PAIN: ICD-10-CM

## 2023-08-21 DIAGNOSIS — M54.41 LEFT-SIDED LOW BACK PAIN WITH BILATERAL SCIATICA, UNSPECIFIED CHRONICITY: Primary | ICD-10-CM

## 2023-08-21 DIAGNOSIS — M54.42 LEFT-SIDED LOW BACK PAIN WITH BILATERAL SCIATICA, UNSPECIFIED CHRONICITY: Primary | ICD-10-CM

## 2023-08-21 LAB
BASOPHILS # BLD AUTO: 0.02 K/UL (ref 0–0.2)
BASOPHILS NFR BLD: 0.2 % (ref 0–1.9)
BILIRUB UR QL STRIP: NEGATIVE
CLARITY UR: CLEAR
COLOR UR: YELLOW
DIFFERENTIAL METHOD: ABNORMAL
EOSINOPHIL # BLD AUTO: 0.3 K/UL (ref 0–0.5)
EOSINOPHIL NFR BLD: 3.5 % (ref 0–8)
ERYTHROCYTE [DISTWIDTH] IN BLOOD BY AUTOMATED COUNT: 13.4 % (ref 11.5–14.5)
GLUCOSE UR QL STRIP: NEGATIVE
HCT VFR BLD AUTO: 47 % (ref 40–54)
HGB BLD-MCNC: 14.9 G/DL (ref 14–18)
HGB UR QL STRIP: NEGATIVE
IMM GRANULOCYTES # BLD AUTO: 0.02 K/UL (ref 0–0.04)
IMM GRANULOCYTES NFR BLD AUTO: 0.2 % (ref 0–0.5)
KETONES UR QL STRIP: NEGATIVE
LEUKOCYTE ESTERASE UR QL STRIP: NEGATIVE
LYMPHOCYTES # BLD AUTO: 1.8 K/UL (ref 1–4.8)
LYMPHOCYTES NFR BLD: 20.3 % (ref 18–48)
MCH RBC QN AUTO: 28.1 PG (ref 27–31)
MCHC RBC AUTO-ENTMCNC: 31.7 G/DL (ref 32–36)
MCV RBC AUTO: 89 FL (ref 82–98)
MONOCYTES # BLD AUTO: 0.5 K/UL (ref 0.3–1)
MONOCYTES NFR BLD: 5.9 % (ref 4–15)
NEUTROPHILS # BLD AUTO: 6.3 K/UL (ref 1.8–7.7)
NEUTROPHILS NFR BLD: 69.9 % (ref 38–73)
NITRITE UR QL STRIP: NEGATIVE
NRBC BLD-RTO: 0 /100 WBC
PH UR STRIP: 7.5 [PH] (ref 5–8)
PLATELET # BLD AUTO: 187 K/UL (ref 150–450)
PMV BLD AUTO: 13.5 FL (ref 9.2–12.9)
PROT UR QL STRIP: NEGATIVE
RBC # BLD AUTO: 5.31 M/UL (ref 4.6–6.2)
SP GR UR STRIP: 1.01 (ref 1–1.03)
URN SPEC COLLECT METH UR: NORMAL
WBC # BLD AUTO: 9.02 K/UL (ref 3.9–12.7)

## 2023-08-21 PROCEDURE — 99213 OFFICE O/P EST LOW 20 MIN: CPT | Mod: S$GLB,,, | Performed by: PHYSICIAN ASSISTANT

## 2023-08-21 PROCEDURE — 99213 PR OFFICE/OUTPT VISIT, EST, LEVL III, 20-29 MIN: ICD-10-PCS | Mod: S$GLB,,, | Performed by: PHYSICIAN ASSISTANT

## 2023-08-21 PROCEDURE — 85025 COMPLETE CBC W/AUTO DIFF WBC: CPT | Performed by: PHYSICIAN ASSISTANT

## 2023-08-21 PROCEDURE — 36415 COLL VENOUS BLD VENIPUNCTURE: CPT | Mod: PO | Performed by: PHYSICIAN ASSISTANT

## 2023-08-21 PROCEDURE — 1159F MED LIST DOCD IN RCRD: CPT | Mod: CPTII,S$GLB,, | Performed by: PHYSICIAN ASSISTANT

## 2023-08-21 PROCEDURE — 3075F PR MOST RECENT SYSTOLIC BLOOD PRESS GE 130-139MM HG: ICD-10-PCS | Mod: CPTII,S$GLB,, | Performed by: PHYSICIAN ASSISTANT

## 2023-08-21 PROCEDURE — 3079F PR MOST RECENT DIASTOLIC BLOOD PRESSURE 80-89 MM HG: ICD-10-PCS | Mod: CPTII,S$GLB,, | Performed by: PHYSICIAN ASSISTANT

## 2023-08-21 PROCEDURE — 81003 URINALYSIS AUTO W/O SCOPE: CPT | Mod: PO | Performed by: PHYSICIAN ASSISTANT

## 2023-08-21 PROCEDURE — 80053 COMPREHEN METABOLIC PANEL: CPT | Performed by: PHYSICIAN ASSISTANT

## 2023-08-21 PROCEDURE — 3008F BODY MASS INDEX DOCD: CPT | Mod: CPTII,S$GLB,, | Performed by: PHYSICIAN ASSISTANT

## 2023-08-21 PROCEDURE — 3079F DIAST BP 80-89 MM HG: CPT | Mod: CPTII,S$GLB,, | Performed by: PHYSICIAN ASSISTANT

## 2023-08-21 PROCEDURE — 99999 PR PBB SHADOW E&M-EST. PATIENT-LVL IV: ICD-10-PCS | Mod: PBBFAC,,, | Performed by: PHYSICIAN ASSISTANT

## 2023-08-21 PROCEDURE — 3075F SYST BP GE 130 - 139MM HG: CPT | Mod: CPTII,S$GLB,, | Performed by: PHYSICIAN ASSISTANT

## 2023-08-21 PROCEDURE — 3008F PR BODY MASS INDEX (BMI) DOCUMENTED: ICD-10-PCS | Mod: CPTII,S$GLB,, | Performed by: PHYSICIAN ASSISTANT

## 2023-08-21 PROCEDURE — 99999 PR PBB SHADOW E&M-EST. PATIENT-LVL IV: CPT | Mod: PBBFAC,,, | Performed by: PHYSICIAN ASSISTANT

## 2023-08-21 PROCEDURE — 1159F PR MEDICATION LIST DOCUMENTED IN MEDICAL RECORD: ICD-10-PCS | Mod: CPTII,S$GLB,, | Performed by: PHYSICIAN ASSISTANT

## 2023-08-21 RX ORDER — PANTOPRAZOLE SODIUM 40 MG/1
TABLET, DELAYED RELEASE ORAL
Qty: 90 TABLET | Refills: 0 | Status: SHIPPED | OUTPATIENT
Start: 2023-08-21 | End: 2023-11-22 | Stop reason: SDUPTHER

## 2023-08-21 RX ORDER — METHOCARBAMOL 750 MG/1
750 TABLET, FILM COATED ORAL 3 TIMES DAILY
Qty: 30 TABLET | Refills: 2 | Status: SHIPPED | OUTPATIENT
Start: 2023-08-21

## 2023-08-21 RX ORDER — MELOXICAM 15 MG/1
15 TABLET ORAL DAILY
Qty: 30 TABLET | Refills: 2 | Status: SHIPPED | OUTPATIENT
Start: 2023-08-21

## 2023-08-21 NOTE — PROGRESS NOTES
Results have been released via [employer]. Please verify that these have been viewed by patient. If not, please call patient with results.     I have sent a message to them with the following interpretation (see below).    I have reviewed your recent urinalysis which was normal.     Please do not hesitate to call or message with any additional questions or concerns.    Lizabeth Bautista PA-C

## 2023-08-21 NOTE — PROGRESS NOTES
Assessment/Plan:    Problem List Items Addressed This Visit    None  Visit Diagnoses       Left-sided low back pain with bilateral sciatica, unspecified chronicity    -  Primary    Relevant Medications    methocarbamoL (ROBAXIN) 750 MG Tab    meloxicam (MOBIC) 15 MG tablet    Left lower quadrant pain        Relevant Orders    CT Abdomen Pelvis  Without Contrast    CBC Auto Differential    Comprehensive Metabolic Panel    Urinalysis, Reflex to Urine Culture Urine, Clean Catch (Completed)        -acute L sided low back pain and L lower abdominal pain  -previous lumbar XR showed disc space narrowing at L4-L5 and L5-S1  -will obtain labs and UA today  -will also obtain CT abd/pelvis to r/o GI etiology  -if neg, likely MSK related  -continue conservative treatment, rest, ice/heat applications, PRN anti-inflammatory medication  -ER precautions for severe or worsening of symptoms    Follow up if symptoms worsen or fail to improve.    Lizabeth Bautista PA-C  _____________________________________________________________________________________________________________________________________________________    CC: low back pain/abdominal pain    HPI: Patient is in clinic today as an established patient here for low back pain. Symptoms have been ongoing for the past few months, but have worsened over the past few days. Denies recent trauma or injuries. Pain is located in his L lumbosacral region and radiates to his L lower abdomen. Denies lower extremity numbness, tingling, weakness or urinary/bowel dysfunction. Denies fever, nausea, vomiting, diarrhea, constipation, bloody/black stool, or weight loss. Also denies dysuria, hematuria or any other urinary symptoms. Patient stated that the pain is worse after eating or drinking and he gets some temporary relief after a bowel movement. He also stated that the pain is worse with bending, lifting, or changes in position. He has been unable to sleep at night due to pain. He was seen  in the clinic for similar symptoms on 4/26 in which he had lumbar XR which showed degenerative changes at L4-L5 and L5-S1 and suspected cholelithiasis. Abdominal US was ordered, however, imaging was never completed. He stated that the back pain has continued to persist since that time, but has gotten significantly worse. He has tried oral steroids, muscle relaxants, and NSAIDs with minimal improvement of pain. He denies prior history of back pain. He has a history of kidney stones, however, he is unsure if current symptoms are related. Also denies history of GI problems. He had a normal colonoscopy in 4/2022 with recs for repeat in 10 years. No other complaints today.     Past Medical History:   Diagnosis Date    GERD (gastroesophageal reflux disease)      Past Surgical History:   Procedure Laterality Date    COLONOSCOPY N/A 04/05/2022    Procedure: COLONOSCOPY;  Surgeon: Oswaldo Childress MD;  Location: [facility];  Service: Endoscopy;  Laterality: N/A;     Review of patient's allergies indicates:  No Known Allergies  Social History     Tobacco Use    Smoking status: Never    Smokeless tobacco: Never   Substance Use Topics    Alcohol use: No    Drug use: No     Family History   Problem Relation Age of Onset    Hypertension Father     Diabetes Father     Cancer Maternal Grandfather     Diabetes Maternal Grandfather     Heart attack Paternal Grandfather     Stroke Paternal Grandfather     Diabetes Paternal Grandfather     Cancer Daughter     Stroke Daughter      Current Outpatient Medications on File Prior to Visit   Medication Sig Dispense Refill    diphenhydrAMINE (BENADRYL) 25 mg capsule Take 25 mg by mouth every 6 (six) hours as needed for Itching.      pantoprazole (PROTONIX) 40 MG tablet TAKE 1 TABLET BY MOUTH ONCE DAILY 90 tablet 0    methylPREDNISolone (MEDROL DOSEPACK) 4 mg tablet follow package directions (Patient not taking: Reported on 8/21/2023) 21 tablet 0    [DISCONTINUED] pantoprazole (PROTONIX) 40 MG  tablet TAKE 1 TABLET BY MOUTH ONCE DAILY 90 tablet 0    [DISCONTINUED] tiZANidine (ZANAFLEX) 4 MG tablet Take 1 tablet (4 mg total) by mouth every 8 (eight) hours. (Patient not taking: Reported on 8/21/2023) 30 tablet 0     No current facility-administered medications on file prior to visit.       Review of Systems   Constitutional:  Negative for chills, diaphoresis, fatigue and fever.   HENT:  Negative for congestion, ear pain, postnasal drip, sinus pain and sore throat.    Eyes:  Negative for pain and redness.   Respiratory:  Negative for cough, chest tightness and shortness of breath.    Cardiovascular:  Negative for chest pain and leg swelling.   Gastrointestinal:  Positive for abdominal pain. Negative for constipation, diarrhea, nausea and vomiting.   Genitourinary:  Negative for dysuria and hematuria.   Musculoskeletal:  Positive for back pain. Negative for arthralgias and joint swelling.   Skin:  Negative for rash.   Neurological:  Negative for dizziness, syncope and headaches.   Psychiatric/Behavioral:  Negative for dysphoric mood. The patient is not nervous/anxious.        Vitals:    08/21/23 1526   BP: 138/89   BP Location: Right arm   Patient Position: Sitting   Pulse: 91   Temp: 98.1 °F (36.7 °C)   Weight: 78 kg (172 lb)       Wt Readings from Last 3 Encounters:   08/21/23 78 kg (172 lb)   04/26/23 77 kg (169 lb 12.8 oz)   04/05/22 78.5 kg (173 lb)       Physical Exam  Constitutional:       General: He is not in acute distress.     Appearance: Normal appearance. He is well-developed.   HENT:      Head: Normocephalic and atraumatic.   Eyes:      Conjunctiva/sclera: Conjunctivae normal.   Cardiovascular:      Rate and Rhythm: Normal rate and regular rhythm.      Pulses: Normal pulses.      Heart sounds: Normal heart sounds. No murmur heard.  Pulmonary:      Effort: Pulmonary effort is normal. No respiratory distress.      Breath sounds: Normal breath sounds.   Abdominal:      General: Bowel sounds are  normal. There is no distension.      Palpations: Abdomen is soft.      Tenderness: There is abdominal tenderness in the left lower quadrant.   Musculoskeletal:         General: Normal range of motion.      Cervical back: Normal range of motion and neck supple.      Lumbar back: Tenderness (L lumbosacral region) present.   Skin:     General: Skin is warm and dry.      Findings: No rash.   Neurological:      General: No focal deficit present.      Mental Status: He is alert and oriented to person, place, and time.   Psychiatric:         Mood and Affect: Mood normal.         Behavior: Behavior normal.         Health Maintenance   Topic Date Due    Lipid Panel  03/14/2027    TETANUS VACCINE  10/09/2031    Colorectal Cancer Screening  04/05/2032    Hepatitis C Screening  Completed    Shingles Vaccine  Completed

## 2023-08-22 ENCOUNTER — PATIENT MESSAGE (OUTPATIENT)
Dept: FAMILY MEDICINE | Facility: CLINIC | Age: 53
End: 2023-08-22
Payer: COMMERCIAL

## 2023-08-22 LAB
ALBUMIN SERPL BCP-MCNC: 4.4 G/DL (ref 3.5–5.2)
ALP SERPL-CCNC: 72 U/L (ref 55–135)
ALT SERPL W/O P-5'-P-CCNC: 27 U/L (ref 10–44)
ANION GAP SERPL CALC-SCNC: 9 MMOL/L (ref 8–16)
AST SERPL-CCNC: 18 U/L (ref 10–40)
BILIRUB SERPL-MCNC: 0.3 MG/DL (ref 0.1–1)
BUN SERPL-MCNC: 19 MG/DL (ref 6–20)
CALCIUM SERPL-MCNC: 9.7 MG/DL (ref 8.7–10.5)
CHLORIDE SERPL-SCNC: 105 MMOL/L (ref 95–110)
CO2 SERPL-SCNC: 27 MMOL/L (ref 23–29)
CREAT SERPL-MCNC: 1.1 MG/DL (ref 0.5–1.4)
EST. GFR  (NO RACE VARIABLE): >60 ML/MIN/1.73 M^2
GLUCOSE SERPL-MCNC: 92 MG/DL (ref 70–110)
POTASSIUM SERPL-SCNC: 4.7 MMOL/L (ref 3.5–5.1)
PROT SERPL-MCNC: 7.3 G/DL (ref 6–8.4)
SODIUM SERPL-SCNC: 141 MMOL/L (ref 136–145)

## 2023-08-22 NOTE — PROGRESS NOTES
Results have been released via [employer]. Please verify that these have been viewed by patient. If not, please call patient with results.     I have sent a message to them with the following interpretation (see below).    I have reviewed your recent blood work.     CBC NORMAL-The CBC appears to be stable at this time with no sign of major anemia, abnormal white count or platelet abnormality.  CMP/BMP NORMAL-The electrolytes all appear stable at this time. This includes kidney functions along with routine electrolytes like sugar, potassium and sodium. The liver enzymes were noted to be stable also.    Please do not hesitate to call or message with any additional questions or concerns.    Lizabeth Bautista PA-C

## 2023-08-28 ENCOUNTER — HOSPITAL ENCOUNTER (OUTPATIENT)
Dept: RADIOLOGY | Facility: HOSPITAL | Age: 53
Discharge: HOME | End: 2023-08-28
Attending: PHYSICIAN ASSISTANT

## 2023-08-28 DIAGNOSIS — R10.32 LEFT LOWER QUADRANT PAIN: ICD-10-CM

## 2023-08-28 PROCEDURE — 25500020 PHARM REV CODE 255: Mod: PO | Performed by: PHYSICIAN ASSISTANT

## 2023-08-28 PROCEDURE — A9698 NON-RAD CONTRAST MATERIALNOC: HCPCS | Mod: PO | Performed by: PHYSICIAN ASSISTANT

## 2023-08-28 PROCEDURE — 74176 CT ABD & PELVIS W/O CONTRAST: CPT | Mod: TC,PO

## 2023-08-28 PROCEDURE — 74176 CT ABD & PELVIS W/O CONTRAST: CPT | Mod: 26,,, | Performed by: RADIOLOGY

## 2023-08-28 PROCEDURE — 74176 CT ABDOMEN PELVIS WITHOUT CONTRAST: ICD-10-PCS | Mod: 26,,, | Performed by: RADIOLOGY

## 2023-08-28 RX ADMIN — IOHEXOL 1000 ML: 9 SOLUTION ORAL at 04:08

## 2023-08-30 ENCOUNTER — PATIENT MESSAGE (OUTPATIENT)
Dept: FAMILY MEDICINE | Facility: CLINIC | Age: 53
End: 2023-08-30
Payer: COMMERCIAL

## 2023-08-30 NOTE — TELEPHONE ENCOUNTER
Please let patient know that I have reviewed his CT abd/pelvis. Imaging showed multiple mesenteric lymph nodes, however, all appeared to be normal in size. He also had a 4 mm stone seen in the L kidney, unlikely to be contributing to his pain. There was also a moderate amount of stool in the colon concerning for constipation. Please make sure he is having regular bowel movements. He had multiple gallstones which could cause pain/discomfort, however, more likely to be RUQ abdominal pain. There were also multiple pulmonary nodules seen on imaging and fluid in the scrotum.     I think his pain is coming from his lumbar spine, however, there were multiple incidental findings seen on imaging. I think if he is continuing to have significant low back pain, we can consider a referral to pain management.

## 2023-11-09 ENCOUNTER — PATIENT MESSAGE (OUTPATIENT)
Dept: FAMILY MEDICINE | Facility: CLINIC | Age: 53
End: 2023-11-09
Payer: COMMERCIAL

## 2023-11-22 DIAGNOSIS — K21.9 GASTROESOPHAGEAL REFLUX DISEASE WITHOUT ESOPHAGITIS: ICD-10-CM

## 2023-11-22 RX ORDER — PANTOPRAZOLE SODIUM 40 MG/1
40 TABLET, DELAYED RELEASE ORAL DAILY
Qty: 90 TABLET | Refills: 1 | Status: SHIPPED | OUTPATIENT
Start: 2023-11-22

## 2023-11-22 NOTE — TELEPHONE ENCOUNTER
No care due was identified.  [facility] Embedded Care Due Messages. Reference number: [id number].   11/22/2023 3:22:11 PM CST

## 2024-05-18 DIAGNOSIS — K21.9 GASTROESOPHAGEAL REFLUX DISEASE WITHOUT ESOPHAGITIS: ICD-10-CM

## 2024-05-20 RX ORDER — PANTOPRAZOLE SODIUM 40 MG/1
40 TABLET, DELAYED RELEASE ORAL
Qty: 90 TABLET | Refills: 1 | Status: SHIPPED | OUTPATIENT
Start: 2024-05-20

## 2024-05-20 NOTE — TELEPHONE ENCOUNTER
Refill Routing Note   Medication(s) are not appropriate for processing by [facility] for the following reason(s):        Non-participating provider:     [facility] action(s):  Route      Medication Therapy Plan:         Appointments  past 12m or future 3m with PCP    Date Provider   Last Visit   8/21/2023 Lizabeth Bautista PA-C   Next Visit   Visit date not found Lizabeth Bautista PA-C   ED visits in past 90 days: 0        Note composed:10:34 AM 05/20/2024

## 2024-09-11 ENCOUNTER — PATIENT MESSAGE (OUTPATIENT)
Dept: FAMILY MEDICINE | Facility: CLINIC | Age: 54
End: 2024-09-11
Payer: COMMERCIAL

## 2024-12-20 ENCOUNTER — OFFICE VISIT (OUTPATIENT)
Dept: FAMILY MEDICINE | Facility: CLINIC | Age: 54
End: 2024-12-20
Payer: COMMERCIAL

## 2024-12-20 VITALS
TEMPERATURE: 98 F | SYSTOLIC BLOOD PRESSURE: 118 MMHG | WEIGHT: 182.81 LBS | OXYGEN SATURATION: 96 % | BODY MASS INDEX: 33.43 KG/M2 | DIASTOLIC BLOOD PRESSURE: 84 MMHG | HEART RATE: 82 BPM | RESPIRATION RATE: 18 BRPM

## 2024-12-20 DIAGNOSIS — Z00.00 ANNUAL PHYSICAL EXAM: Primary | ICD-10-CM

## 2024-12-20 DIAGNOSIS — Z12.5 SCREENING FOR PROSTATE CANCER: ICD-10-CM

## 2024-12-20 DIAGNOSIS — K21.9 GASTROESOPHAGEAL REFLUX DISEASE WITHOUT ESOPHAGITIS: ICD-10-CM

## 2024-12-20 DIAGNOSIS — J30.9 ALLERGIC SINUSITIS: ICD-10-CM

## 2024-12-20 PROCEDURE — 1159F MED LIST DOCD IN RCRD: CPT | Mod: CPTII,S$GLB,, | Performed by: NURSE PRACTITIONER

## 2024-12-20 PROCEDURE — 3079F DIAST BP 80-89 MM HG: CPT | Mod: CPTII,S$GLB,, | Performed by: NURSE PRACTITIONER

## 2024-12-20 PROCEDURE — 99999 PR PBB SHADOW E&M-EST. PATIENT-LVL IV: CPT | Mod: PBBFAC,,, | Performed by: NURSE PRACTITIONER

## 2024-12-20 PROCEDURE — 99214 OFFICE O/P EST MOD 30 MIN: CPT | Mod: S$GLB,,, | Performed by: NURSE PRACTITIONER

## 2024-12-20 PROCEDURE — 3044F HG A1C LEVEL LT 7.0%: CPT | Mod: CPTII,S$GLB,, | Performed by: NURSE PRACTITIONER

## 2024-12-20 PROCEDURE — G2211 COMPLEX E/M VISIT ADD ON: HCPCS | Mod: S$GLB,,, | Performed by: NURSE PRACTITIONER

## 2024-12-20 PROCEDURE — 3074F SYST BP LT 130 MM HG: CPT | Mod: CPTII,S$GLB,, | Performed by: NURSE PRACTITIONER

## 2024-12-20 PROCEDURE — 3008F BODY MASS INDEX DOCD: CPT | Mod: CPTII,S$GLB,, | Performed by: NURSE PRACTITIONER

## 2024-12-20 PROCEDURE — 1160F RVW MEDS BY RX/DR IN RCRD: CPT | Mod: CPTII,S$GLB,, | Performed by: NURSE PRACTITIONER

## 2024-12-20 RX ORDER — PANTOPRAZOLE SODIUM 40 MG/1
40 TABLET, DELAYED RELEASE ORAL DAILY
Qty: 90 TABLET | Refills: 3 | Status: SHIPPED | OUTPATIENT
Start: 2024-12-20

## 2024-12-20 RX ORDER — BENZONATATE 200 MG/1
200 CAPSULE ORAL 3 TIMES DAILY PRN
Qty: 30 CAPSULE | Refills: 0 | Status: SHIPPED | OUTPATIENT
Start: 2024-12-20 | End: 2024-12-30

## 2024-12-20 RX ORDER — PREDNISONE 20 MG/1
20 TABLET ORAL 2 TIMES DAILY
Qty: 10 TABLET | Refills: 0 | Status: SHIPPED | OUTPATIENT
Start: 2024-12-20 | End: 2024-12-25

## 2024-12-26 ENCOUNTER — LAB VISIT (OUTPATIENT)
Dept: LAB | Facility: HOSPITAL | Age: 54
End: 2024-12-26
Attending: NURSE PRACTITIONER
Payer: COMMERCIAL

## 2024-12-26 ENCOUNTER — PATIENT MESSAGE (OUTPATIENT)
Dept: FAMILY MEDICINE | Facility: CLINIC | Age: 54
End: 2024-12-26
Payer: COMMERCIAL

## 2024-12-26 DIAGNOSIS — Z00.00 ANNUAL PHYSICAL EXAM: ICD-10-CM

## 2024-12-26 DIAGNOSIS — Z12.5 SCREENING FOR PROSTATE CANCER: ICD-10-CM

## 2024-12-26 LAB
ALBUMIN SERPL BCP-MCNC: 3.8 G/DL (ref 3.5–5.2)
ALP SERPL-CCNC: 70 U/L (ref 40–150)
ALT SERPL W/O P-5'-P-CCNC: 32 U/L (ref 10–44)
ANION GAP SERPL CALC-SCNC: 5 MMOL/L (ref 8–16)
AST SERPL-CCNC: 13 U/L (ref 10–40)
BASOPHILS # BLD AUTO: 0.03 K/UL (ref 0–0.2)
BASOPHILS NFR BLD: 0.3 % (ref 0–1.9)
BILIRUB SERPL-MCNC: 0.4 MG/DL (ref 0.1–1)
BUN SERPL-MCNC: 14 MG/DL (ref 6–20)
CALCIUM SERPL-MCNC: 9 MG/DL (ref 8.7–10.5)
CHLORIDE SERPL-SCNC: 108 MMOL/L (ref 95–110)
CHOLEST SERPL-MCNC: 204 MG/DL (ref 120–199)
CHOLEST/HDLC SERPL: 3.1 {RATIO} (ref 2–5)
CO2 SERPL-SCNC: 29 MMOL/L (ref 23–29)
COMPLEXED PSA SERPL-MCNC: 1.7 NG/ML (ref 0–4)
CREAT SERPL-MCNC: 0.8 MG/DL (ref 0.5–1.4)
DIFFERENTIAL METHOD BLD: ABNORMAL
EOSINOPHIL # BLD AUTO: 0 K/UL (ref 0–0.5)
EOSINOPHIL NFR BLD: 0.4 % (ref 0–8)
ERYTHROCYTE [DISTWIDTH] IN BLOOD BY AUTOMATED COUNT: 13.9 % (ref 11.5–14.5)
EST. GFR  (NO RACE VARIABLE): >60 ML/MIN/1.73 M^2
ESTIMATED AVG GLUCOSE: 111 MG/DL (ref 68–131)
GLUCOSE SERPL-MCNC: 99 MG/DL (ref 70–110)
HBA1C MFR BLD: 5.5 % (ref 4–5.6)
HCT VFR BLD AUTO: 48.2 % (ref 40–54)
HDLC SERPL-MCNC: 65 MG/DL (ref 40–75)
HDLC SERPL: 31.9 % (ref 20–50)
HGB BLD-MCNC: 15.1 G/DL (ref 14–18)
IMM GRANULOCYTES # BLD AUTO: 0.04 K/UL (ref 0–0.04)
IMM GRANULOCYTES NFR BLD AUTO: 0.4 % (ref 0–0.5)
LDLC SERPL CALC-MCNC: 128 MG/DL (ref 63–159)
LYMPHOCYTES # BLD AUTO: 1.9 K/UL (ref 1–4.8)
LYMPHOCYTES NFR BLD: 19.5 % (ref 18–48)
MCH RBC QN AUTO: 28.5 PG (ref 27–31)
MCHC RBC AUTO-ENTMCNC: 31.3 G/DL (ref 32–36)
MCV RBC AUTO: 91 FL (ref 82–98)
MONOCYTES # BLD AUTO: 0.6 K/UL (ref 0.3–1)
MONOCYTES NFR BLD: 5.6 % (ref 4–15)
NEUTROPHILS # BLD AUTO: 7.4 K/UL (ref 1.8–7.7)
NEUTROPHILS NFR BLD: 73.8 % (ref 38–73)
NONHDLC SERPL-MCNC: 139 MG/DL
NRBC BLD-RTO: 0 /100 WBC
PLATELET # BLD AUTO: 195 K/UL (ref 150–450)
PMV BLD AUTO: 13.5 FL (ref 9.2–12.9)
POTASSIUM SERPL-SCNC: 4.7 MMOL/L (ref 3.5–5.1)
PROT SERPL-MCNC: 6.3 G/DL (ref 6–8.4)
RBC # BLD AUTO: 5.29 M/UL (ref 4.6–6.2)
SODIUM SERPL-SCNC: 142 MMOL/L (ref 136–145)
TRIGL SERPL-MCNC: 55 MG/DL (ref 30–150)
WBC # BLD AUTO: 9.97 K/UL (ref 3.9–12.7)

## 2024-12-26 PROCEDURE — 80053 COMPREHEN METABOLIC PANEL: CPT | Performed by: NURSE PRACTITIONER

## 2024-12-26 PROCEDURE — 36415 COLL VENOUS BLD VENIPUNCTURE: CPT | Mod: PO | Performed by: NURSE PRACTITIONER

## 2024-12-26 PROCEDURE — 85025 COMPLETE CBC W/AUTO DIFF WBC: CPT | Performed by: NURSE PRACTITIONER

## 2024-12-26 PROCEDURE — 84153 ASSAY OF PSA TOTAL: CPT | Performed by: NURSE PRACTITIONER

## 2024-12-26 PROCEDURE — 80061 LIPID PANEL: CPT | Performed by: NURSE PRACTITIONER

## 2024-12-26 PROCEDURE — 83036 HEMOGLOBIN GLYCOSYLATED A1C: CPT | Performed by: NURSE PRACTITIONER

## 2025-01-01 NOTE — PROGRESS NOTES
Subjective:       Patient ID: Young Haynes Jr. is a 54 y.o. male.    Chief Complaint: Annual Exam    HPI    History of Present Illness    CHIEF COMPLAINT:  Mr. Haynes presents today for an annual follow-up with a persistent cough.    RESPIRATORY:  He reports a persistent cough for about a month, which is mild during the day but worsens at night and in the morning. The cough causes chest pain but is non-productive. Symptoms are exacerbated when coming inside from the cold. He denies fever, body aches, or shortness of breath.    ENT:  He reports ear pain a couple weeks ago and experiences a runny nose due to allergies.    MEDICATIONS:  He previously used Protonix for acid reflux, taking it at 9 AM, but is currently not taking it.    DIET:  He follows a high protein diet consisting primarily of chicken, steak, eggs, and vegetables. He consumes protein supplements and bars, timing protein shake intake four hours after previous Protonix administration for proper nutrient absorption.    He donates plasma twice weekly and has been told that he is unable to donate because his protein is too low. He is concerned about this and wants to be able to resume his donation.     ROS:  General: -fever, -chills, -fatigue, -weight gain, -weight loss, -loss of appetite  Eyes: -vision changes, -blurry vision, -eye pain, -eye discharge  ENT: -ear pain, -hearing loss, -tinnitus, -nasal congestion, -sore throat, +runny nose  Cardiovascular: +chest pain, -palpitations, -lower extremity edema  Respiratory: +cough, -shortness of breath, -wheezing, -sputum production  Endocrine: -polyuria, -polydipsia, -heat intolerance, -cold intolerance  Gastrointestinal: -abdominal pain, -heartburn, -nausea, -vomiting, -diarrhea, -constipation, -blood in stool  Genitourinary: -dysuria, -urgency, -frequency, -hematuria, -nocturia, -incontinence  Heme & Lymphatic: -easy or excessive bleeding, -easy bruising, -swollen lymph nodes  Musculoskeletal: -muscle  pain, -back pain, -joint pain, -joint swelling  Skin: -rash, -lesion, -itching, -skin texture changes, -skin color changes  Neurological: -headache, -dizziness, -numbness, -tingling, -seizure activity, -speech difficulty, -memory loss, -confusion  Psychiatric: -anxiety, -depression, -sleep difficulty         Vitals:    12/20/24 1548   BP: 118/84   Pulse:    Resp:    Temp:        Objective:     Current Outpatient Medications   Medication Sig Dispense Refill    diphenhydrAMINE (BENADRYL) 25 mg capsule Take 25 mg by mouth every 6 (six) hours as needed for Itching.      meloxicam (MOBIC) 15 MG tablet Take 1 tablet (15 mg total) by mouth once daily. 30 tablet 2    methocarbamoL (ROBAXIN) 750 MG Tab Take 1 tablet (750 mg total) by mouth 3 (three) times daily. 30 tablet 2    pantoprazole (PROTONIX) 40 MG tablet Take 1 tablet (40 mg total) by mouth once daily. 90 tablet 3     No current facility-administered medications for this visit.       Physical Exam  Vitals and nursing note reviewed.   Constitutional:       General: He is not in acute distress.     Appearance: He is well-developed.   HENT:      Head: Normocephalic and atraumatic.      Right Ear: Tympanic membrane normal.      Left Ear: Tympanic membrane normal.      Nose: Mucosal edema, congestion and rhinorrhea present.      Mouth/Throat:      Pharynx: Pharyngeal swelling and postnasal drip present.   Eyes:      Pupils: Pupils are equal, round, and reactive to light.   Cardiovascular:      Rate and Rhythm: Normal rate and regular rhythm.   Pulmonary:      Effort: Pulmonary effort is normal.      Breath sounds: Normal breath sounds.      Comments: +cough  Musculoskeletal:         General: Normal range of motion.      Cervical back: Normal range of motion and neck supple.   Skin:     General: Skin is warm and dry.      Findings: No rash.   Neurological:      Mental Status: He is alert and oriented to person, place, and time.   Psychiatric:         Thought Content:  Thought content normal.         Assessment:       1. Annual physical exam    2. Gastroesophageal reflux disease without esophagitis    3. Allergic sinusitis    4. Screening for prostate cancer        Plan:   Annual physical exam  -     Lipid Panel; Future; Expected date: 12/20/2024  -     Hemoglobin A1C; Future; Expected date: 12/20/2024  -     PSA, Screening; Future; Expected date: 12/20/2024  -     Comprehensive Metabolic Panel; Future; Expected date: 12/20/2024  -     CBC Auto Differential; Future; Expected date: 12/20/2024    Gastroesophageal reflux disease without esophagitis  -     pantoprazole (PROTONIX) 40 MG tablet; Take 1 tablet (40 mg total) by mouth once daily.  Dispense: 90 tablet; Refill: 3    Allergic sinusitis  -     predniSONE (DELTASONE) 20 MG tablet; Take 1 tablet (20 mg total) by mouth 2 (two) times daily. for 5 days  Dispense: 10 tablet; Refill: 0  -     benzonatate (TESSALON) 200 MG capsule; Take 1 capsule (200 mg total) by mouth 3 (three) times daily as needed for Cough.  Dispense: 30 capsule; Refill:     Screening for prostate cancer  -     PSA, Screening; Future; Expected date: 12/20/2024      This note was generated with the assistance of ambient listening technology. Verbal consent was obtained by the patient and accompanying visitor(s) for the recording of patient appointment to facilitate this note. I attest to having reviewed and edited the generated note for accuracy, though some syntax or spelling errors may persist. Please contact the author of this note for any clarification.    Visit today included increased complexity associated with the care of the episodic problem see above assessment addressed and managing the longitudinal care of the patient due to the serious and/or complex managed problem(s) see above.          There are no Patient Instructions on file for this visit.

## 2025-02-28 ENCOUNTER — HOSPITAL ENCOUNTER (OUTPATIENT)
Dept: RADIOLOGY | Facility: HOSPITAL | Age: 55
Discharge: HOME | End: 2025-02-28
Attending: NURSE PRACTITIONER
Payer: COMMERCIAL

## 2025-02-28 ENCOUNTER — OFFICE VISIT (OUTPATIENT)
Dept: FAMILY MEDICINE | Facility: CLINIC | Age: 55
End: 2025-02-28
Payer: COMMERCIAL

## 2025-02-28 VITALS
BODY MASS INDEX: 33 KG/M2 | TEMPERATURE: 98 F | WEIGHT: 179.31 LBS | SYSTOLIC BLOOD PRESSURE: 139 MMHG | HEIGHT: 62 IN | DIASTOLIC BLOOD PRESSURE: 88 MMHG | OXYGEN SATURATION: 97 % | RESPIRATION RATE: 18 BRPM | HEART RATE: 83 BPM

## 2025-02-28 DIAGNOSIS — R05.3 CHRONIC COUGH: ICD-10-CM

## 2025-02-28 DIAGNOSIS — J30.9 CHRONIC ALLERGIC RHINITIS: Primary | ICD-10-CM

## 2025-02-28 PROCEDURE — 1160F RVW MEDS BY RX/DR IN RCRD: CPT | Mod: CPTII,S$GLB,, | Performed by: NURSE PRACTITIONER

## 2025-02-28 PROCEDURE — 99214 OFFICE O/P EST MOD 30 MIN: CPT | Mod: S$GLB,,, | Performed by: NURSE PRACTITIONER

## 2025-02-28 PROCEDURE — 99999 PR PBB SHADOW E&M-EST. PATIENT-LVL IV: CPT | Mod: PBBFAC,,, | Performed by: NURSE PRACTITIONER

## 2025-02-28 PROCEDURE — 71046 X-RAY EXAM CHEST 2 VIEWS: CPT | Mod: 26,,, | Performed by: RADIOLOGY

## 2025-02-28 PROCEDURE — 3008F BODY MASS INDEX DOCD: CPT | Mod: CPTII,S$GLB,, | Performed by: NURSE PRACTITIONER

## 2025-02-28 PROCEDURE — 71046 X-RAY EXAM CHEST 2 VIEWS: CPT | Mod: TC,PO

## 2025-02-28 PROCEDURE — G2211 COMPLEX E/M VISIT ADD ON: HCPCS | Mod: S$GLB,,, | Performed by: NURSE PRACTITIONER

## 2025-02-28 PROCEDURE — 3075F SYST BP GE 130 - 139MM HG: CPT | Mod: CPTII,S$GLB,, | Performed by: NURSE PRACTITIONER

## 2025-02-28 PROCEDURE — 3079F DIAST BP 80-89 MM HG: CPT | Mod: CPTII,S$GLB,, | Performed by: NURSE PRACTITIONER

## 2025-02-28 PROCEDURE — 1159F MED LIST DOCD IN RCRD: CPT | Mod: CPTII,S$GLB,, | Performed by: NURSE PRACTITIONER

## 2025-02-28 RX ORDER — ALBUTEROL SULFATE 90 UG/1
2 INHALANT RESPIRATORY (INHALATION) EVERY 6 HOURS PRN
Qty: 18 G | Refills: 0 | Status: SHIPPED | OUTPATIENT
Start: 2025-02-28 | End: 2026-02-28

## 2025-02-28 RX ORDER — MONTELUKAST SODIUM 10 MG/1
10 TABLET ORAL NIGHTLY
Qty: 30 TABLET | Refills: 2 | Status: SHIPPED | OUTPATIENT
Start: 2025-02-28 | End: 2025-05-29

## 2025-03-03 ENCOUNTER — TELEPHONE (OUTPATIENT)
Dept: FAMILY MEDICINE | Facility: CLINIC | Age: 55
End: 2025-03-03
Payer: COMMERCIAL

## 2025-03-03 ENCOUNTER — PATIENT MESSAGE (OUTPATIENT)
Dept: FAMILY MEDICINE | Facility: CLINIC | Age: 55
End: 2025-03-03
Payer: COMMERCIAL

## 2025-03-03 RX ORDER — PROMETHAZINE HYDROCHLORIDE AND DEXTROMETHORPHAN HYDROBROMIDE 6.25; 15 MG/5ML; MG/5ML
5 SYRUP ORAL EVERY 4 HOURS PRN
Qty: 118 ML | Refills: 0 | Status: SHIPPED | OUTPATIENT
Start: 2025-03-03 | End: 2025-03-13

## 2025-03-03 NOTE — TELEPHONE ENCOUNTER
Called patient's wife at her request in message. Patient's wife states that patient is coughing worse on albuterol inhaler than before and states that patient is not sleeping. Pt's wife states that she gave him some cough medicine that she had leftover and wanted to a prescription for him called in. Informed patient's wife that we could not call in medication without getting an exact prescription from her and we would consult with on-call doctor. Verbalized understanding, states she will send in prescription when able.

## 2025-03-03 NOTE — TELEPHONE ENCOUNTER
Patient saw Riya 02/28/25 for cough. Pt states albuterol inhaler is not helping and is making his coughing worse. Pt has been taking wife's promethazine DM cough syrup and states it has been helping and would like a prescription called in. Please advise. Thank you

## 2025-03-27 ENCOUNTER — RESULTS FOLLOW-UP (OUTPATIENT)
Dept: FAMILY MEDICINE | Facility: CLINIC | Age: 55
End: 2025-03-27

## 2025-03-27 NOTE — PROGRESS NOTES
Subjective:       Patient ID: Young Haynes Jr. is a 54 y.o. male.    Chief Complaint: Cough (Pt c/o cough lasting over 1 month. Pt states it is worse than it was when he was seen here last time for same issue. )    HPI    Mr. Haynes presents today for persistent cough and sinus symptoms    RESPIRATORY SYMPTOMS:  He reports a deep cough with rattling sensation extending to his abdomen, accompanied by blood-tinged sputum. Morning symptoms are most severe, describing being congested upon waking with coughing and gagging. He has been able to expectorate phlegm, approximately fingertip-sized pieces. The cough persists throughout day and night, affecting his sleep. He is unable to clear his airways effectively despite feeling congested.    CURRENT MEDICATIONS AND TREATMENT:  He completed a course of steroids in December with symptoms returning two days after completion. He has attempted self-treatment with multiple bottles of NyQuil, DayQuil, and recently Mucinex, along with frequent cough drops, all providing minimal symptom relief.    ALLERGY HISTORY:  He has severe allergies to plants and animals, reporting allergy test results as maximal for both categories. His symptoms worsen during spring pollen seasons. He takes Benadryl nightly, increasing to twice daily during high pollen seasons, without experiencing drowsiness. He used an inhaler as a child for allergy symptoms but denies prior asthma diagnosis.    FAMILY HISTORY:  Brother  from pneumonia last year.      ROS:  General: -fever, -chills, -fatigue, -weight gain, -weight loss, -loss of appetite  Eyes: -vision changes, -blurry vision, -eye pain, -eye discharge  ENT: -ear pain, -hearing loss, -tinnitus, +nasal congestion, -sore throat, -post nasal drip  Cardiovascular: -chest pain, -palpitations, -lower extremity edema  Respiratory: +cough, -shortness of breath, -wheezing, -sputum production  Endocrine: -polyuria, -polydipsia, -heat intolerance, -cold  intolerance  Gastrointestinal: -abdominal pain, -heartburn, -nausea, -vomiting, -diarrhea, -constipation, -blood in stool  Genitourinary: -dysuria, -urgency, -frequency, -hematuria, -nocturia, -incontinence  Heme & Lymphatic: -easy or excessive bleeding, -easy bruising, -swollen lymph nodes  Musculoskeletal: -muscle pain, -back pain, -joint pain, -joint swelling  Skin: -rash, -lesion, -itching, -skin texture changes, -skin color changes  Neurological: -headache, -dizziness, -numbness, -tingling, -seizure activity, -speech difficulty, -memory loss, -confusion  Psychiatric: -anxiety, -depression, -sleep difficulty         Vitals:    02/28/25 1005   BP: 139/88   Pulse: 83   Resp: 18   Temp: 98.3 °F (36.8 °C)       Objective:   Current Medications[1]    Physical Exam  Vitals and nursing note reviewed.   Constitutional:       General: He is not in acute distress.     Appearance: He is well-developed.   HENT:      Head: Normocephalic and atraumatic.   Eyes:      Pupils: Pupils are equal, round, and reactive to light.   Cardiovascular:      Rate and Rhythm: Normal rate and regular rhythm.   Pulmonary:      Effort: Pulmonary effort is normal.      Breath sounds: Normal breath sounds. No decreased breath sounds or rhonchi.      Comments: +cough  Musculoskeletal:         General: Normal range of motion.      Cervical back: Normal range of motion and neck supple.   Skin:     General: Skin is warm and dry.      Findings: No rash.   Neurological:      Mental Status: He is alert and oriented to person, place, and time.   Psychiatric:         Thought Content: Thought content normal.         Assessment:       1. Chronic allergic rhinitis    2. Chronic cough        Plan:     PATIENT EDUCATION:  - Explained that Singulair works differently from antihistamines like Zyrtec and Claritin, targeting leukotriene-based allergens which tend to affect the lungs more    MEDICATIONS:  -     albuterol (PROVENTIL HFA) 90 mcg/actuation inhaler;  Inhale 2 puffs into the lungs every 6 (six) hours as needed (cough). Rescue  Dispense: 18 g; Refill: 0  -     montelukast (SINGULAIR) 10 mg tablet; Take 1 tablet (10 mg total) by mouth every evening.  Dispense: 30 tablet; Refill: 2    ORDERS:  -     X-Ray Chest PA And Lateral; Future; Expected date: 02/28/2025          Visit today included increased complexity associated with the care of the episodic problem see above assessment addressed and managing the longitudinal care of the patient due to the serious and/or complex managed problem(s) see above.    This note was generated with the assistance of ambient listening technology. Verbal consent was obtained by the patient and accompanying visitor(s) for the recording of patient appointment to facilitate this note. I attest to having reviewed and edited the generated note for accuracy, though some syntax or spelling errors may persist. Please contact the author of this note for any clarification.      Follow up if symptoms worsen or fail to improve.    There are no Patient Instructions on file for this visit.         [1]   Current Outpatient Medications   Medication Sig Dispense Refill    diphenhydrAMINE (BENADRYL) 25 mg capsule Take 25 mg by mouth every 6 (six) hours as needed for Itching.      meloxicam (MOBIC) 15 MG tablet Take 1 tablet (15 mg total) by mouth once daily. 30 tablet 2    methocarbamoL (ROBAXIN) 750 MG Tab Take 1 tablet (750 mg total) by mouth 3 (three) times daily. 30 tablet 2    pantoprazole (PROTONIX) 40 MG tablet Take 1 tablet (40 mg total) by mouth once daily. 90 tablet 3    albuterol (PROVENTIL HFA) 90 mcg/actuation inhaler Inhale 2 puffs into the lungs every 6 (six) hours as needed (cough). Rescue 18 g 0    montelukast (SINGULAIR) 10 mg tablet Take 1 tablet (10 mg total) by mouth every evening. 30 tablet 2     No current facility-administered medications for this visit.

## 2025-05-26 RX ORDER — MONTELUKAST SODIUM 10 MG/1
10 TABLET ORAL NIGHTLY
Qty: 30 TABLET | Refills: 6 | Status: SHIPPED | OUTPATIENT
Start: 2025-05-26